# Patient Record
Sex: FEMALE | Race: WHITE | NOT HISPANIC OR LATINO | Employment: OTHER | URBAN - METROPOLITAN AREA
[De-identification: names, ages, dates, MRNs, and addresses within clinical notes are randomized per-mention and may not be internally consistent; named-entity substitution may affect disease eponyms.]

---

## 2023-10-10 ENCOUNTER — TRANSCRIBE ORDERS (OUTPATIENT)
Dept: ADMINISTRATIVE | Facility: HOSPITAL | Age: 73
End: 2023-10-10
Payer: COMMERCIAL

## 2023-10-10 DIAGNOSIS — R19.4 CHANGE IN BOWEL HABITS: ICD-10-CM

## 2023-10-10 DIAGNOSIS — R11.2 NAUSEA AND VOMITING, UNSPECIFIED VOMITING TYPE: Primary | ICD-10-CM

## 2023-10-10 DIAGNOSIS — R63.4 WEIGHT LOSS: ICD-10-CM

## 2023-10-31 ENCOUNTER — TRANSCRIBE ORDERS (OUTPATIENT)
Dept: LAB | Facility: HOSPITAL | Age: 73
End: 2023-10-31
Payer: COMMERCIAL

## 2023-10-31 ENCOUNTER — HOSPITAL ENCOUNTER (OUTPATIENT)
Dept: CT IMAGING | Facility: HOSPITAL | Age: 73
Discharge: HOME | End: 2023-10-31
Admitting: INTERNAL MEDICINE
Payer: COMMERCIAL

## 2023-10-31 ENCOUNTER — LAB (OUTPATIENT)
Dept: LAB | Facility: HOSPITAL | Age: 73
End: 2023-10-31
Payer: COMMERCIAL

## 2023-10-31 ENCOUNTER — APPOINTMENT (OUTPATIENT)
Dept: CT IMAGING | Facility: HOSPITAL | Age: 73
End: 2023-10-31
Payer: COMMERCIAL

## 2023-10-31 DIAGNOSIS — R63.4 WEIGHT LOSS: ICD-10-CM

## 2023-10-31 DIAGNOSIS — R19.4 CHANGE IN BOWEL HABITS: ICD-10-CM

## 2023-10-31 DIAGNOSIS — R19.4 FREQUENT BOWEL MOVEMENTS: ICD-10-CM

## 2023-10-31 DIAGNOSIS — R19.4 FREQUENT BOWEL MOVEMENTS: Primary | ICD-10-CM

## 2023-10-31 DIAGNOSIS — R11.2 NAUSEA AND VOMITING, UNSPECIFIED VOMITING TYPE: ICD-10-CM

## 2023-10-31 LAB
ALBUMIN SERPL-MCNC: 3.9 G/DL (ref 3.5–5.2)
ALBUMIN/GLOB SERPL: 1.6 G/DL
ALP SERPL-CCNC: 81 U/L (ref 39–117)
ALT SERPL W P-5'-P-CCNC: 18 U/L (ref 1–33)
ANION GAP SERPL CALCULATED.3IONS-SCNC: 10.6 MMOL/L (ref 5–15)
AST SERPL-CCNC: 26 U/L (ref 1–32)
BASOPHILS # BLD AUTO: 0.04 10*3/MM3 (ref 0–0.2)
BASOPHILS NFR BLD AUTO: 0.6 % (ref 0–1.5)
BILIRUB SERPL-MCNC: 0.6 MG/DL (ref 0–1.2)
BUN SERPL-MCNC: 15 MG/DL (ref 8–23)
BUN/CREAT SERPL: 12.8 (ref 7–25)
CALCIUM SPEC-SCNC: 9.5 MG/DL (ref 8.6–10.5)
CHLORIDE SERPL-SCNC: 105 MMOL/L (ref 98–107)
CO2 SERPL-SCNC: 22.4 MMOL/L (ref 22–29)
CREAT BLDA-MCNC: 1.2 MG/DL (ref 0.6–1.3)
CREAT SERPL-MCNC: 1.17 MG/DL (ref 0.57–1)
DEPRECATED RDW RBC AUTO: 41 FL (ref 37–54)
EGFRCR SERPLBLD CKD-EPI 2021: 49.7 ML/MIN/1.73
EOSINOPHIL # BLD AUTO: 0.06 10*3/MM3 (ref 0–0.4)
EOSINOPHIL NFR BLD AUTO: 0.9 % (ref 0.3–6.2)
ERYTHROCYTE [DISTWIDTH] IN BLOOD BY AUTOMATED COUNT: 11.8 % (ref 12.3–15.4)
GLOBULIN UR ELPH-MCNC: 2.5 GM/DL
GLUCOSE SERPL-MCNC: 175 MG/DL (ref 65–99)
HCT VFR BLD AUTO: 44.3 % (ref 34–46.6)
HGB BLD-MCNC: 14.7 G/DL (ref 12–15.9)
IMM GRANULOCYTES # BLD AUTO: 0.03 10*3/MM3 (ref 0–0.05)
IMM GRANULOCYTES NFR BLD AUTO: 0.4 % (ref 0–0.5)
LDH SERPL-CCNC: 303 U/L (ref 135–214)
LYMPHOCYTES # BLD AUTO: 1.92 10*3/MM3 (ref 0.7–3.1)
LYMPHOCYTES NFR BLD AUTO: 28.7 % (ref 19.6–45.3)
MCH RBC QN AUTO: 32 PG (ref 26.6–33)
MCHC RBC AUTO-ENTMCNC: 33.2 G/DL (ref 31.5–35.7)
MCV RBC AUTO: 96.3 FL (ref 79–97)
MONOCYTES # BLD AUTO: 0.23 10*3/MM3 (ref 0.1–0.9)
MONOCYTES NFR BLD AUTO: 3.4 % (ref 5–12)
NEUTROPHILS NFR BLD AUTO: 4.41 10*3/MM3 (ref 1.7–7)
NEUTROPHILS NFR BLD AUTO: 66 % (ref 42.7–76)
NRBC BLD AUTO-RTO: 0 /100 WBC (ref 0–0.2)
PLATELET # BLD AUTO: 200 10*3/MM3 (ref 140–450)
PMV BLD AUTO: 10.9 FL (ref 6–12)
POTASSIUM SERPL-SCNC: 3.9 MMOL/L (ref 3.5–5.2)
PROT SERPL-MCNC: 6.4 G/DL (ref 6–8.5)
RBC # BLD AUTO: 4.6 10*6/MM3 (ref 3.77–5.28)
SODIUM SERPL-SCNC: 138 MMOL/L (ref 136–145)
WBC NRBC COR # BLD: 6.69 10*3/MM3 (ref 3.4–10.8)

## 2023-10-31 PROCEDURE — 82565 ASSAY OF CREATININE: CPT

## 2023-10-31 PROCEDURE — 80053 COMPREHEN METABOLIC PANEL: CPT | Performed by: INTERNAL MEDICINE

## 2023-10-31 PROCEDURE — 83615 LACTATE (LD) (LDH) ENZYME: CPT

## 2023-10-31 PROCEDURE — 85025 COMPLETE CBC W/AUTO DIFF WBC: CPT

## 2023-10-31 PROCEDURE — 74177 CT ABD & PELVIS W/CONTRAST: CPT

## 2023-10-31 PROCEDURE — 25510000001 IOPAMIDOL 61 % SOLUTION: Performed by: INTERNAL MEDICINE

## 2023-10-31 PROCEDURE — 36415 COLL VENOUS BLD VENIPUNCTURE: CPT | Performed by: INTERNAL MEDICINE

## 2023-10-31 PROCEDURE — 71260 CT THORAX DX C+: CPT

## 2023-10-31 RX ADMIN — IOPAMIDOL 85 ML: 612 INJECTION, SOLUTION INTRAVENOUS at 13:46

## 2024-01-04 ENCOUNTER — LAB (OUTPATIENT)
Dept: LAB | Facility: HOSPITAL | Age: 74
End: 2024-01-04
Payer: COMMERCIAL

## 2024-01-04 ENCOUNTER — CONSULT (OUTPATIENT)
Dept: ONCOLOGY | Facility: CLINIC | Age: 74
End: 2024-01-04
Payer: COMMERCIAL

## 2024-01-04 VITALS
DIASTOLIC BLOOD PRESSURE: 75 MMHG | WEIGHT: 162 LBS | HEART RATE: 85 BPM | HEIGHT: 62 IN | RESPIRATION RATE: 16 BRPM | BODY MASS INDEX: 29.81 KG/M2 | TEMPERATURE: 97.6 F | SYSTOLIC BLOOD PRESSURE: 142 MMHG | OXYGEN SATURATION: 97 %

## 2024-01-04 DIAGNOSIS — C82.09 GRADE I FOLLICULAR LYMPHOMA OF EXTRANODAL SITE EXCLUDING SPLEEN AND OTHER SOLID ORGANS: ICD-10-CM

## 2024-01-04 DIAGNOSIS — C82.09 GRADE I FOLLICULAR LYMPHOMA OF EXTRANODAL SITE EXCLUDING SPLEEN AND OTHER SOLID ORGANS: Primary | ICD-10-CM

## 2024-01-04 LAB
ALBUMIN SERPL-MCNC: 4.3 G/DL (ref 3.5–5.2)
ALBUMIN/GLOB SERPL: 1.7 G/DL
ALP SERPL-CCNC: 129 U/L (ref 39–117)
ALT SERPL W P-5'-P-CCNC: 16 U/L (ref 1–33)
ANION GAP SERPL CALCULATED.3IONS-SCNC: 9 MMOL/L (ref 5–15)
AST SERPL-CCNC: 24 U/L (ref 1–32)
BASOPHILS # BLD AUTO: 0.02 10*3/MM3 (ref 0–0.2)
BASOPHILS NFR BLD AUTO: 0.4 % (ref 0–1.5)
BILIRUB SERPL-MCNC: 1.1 MG/DL (ref 0–1.2)
BUN SERPL-MCNC: 11 MG/DL (ref 8–23)
BUN/CREAT SERPL: 10.8 (ref 7–25)
CALCIUM SPEC-SCNC: 9.5 MG/DL (ref 8.6–10.5)
CHLORIDE SERPL-SCNC: 110 MMOL/L (ref 98–107)
CO2 SERPL-SCNC: 26 MMOL/L (ref 22–29)
CREAT SERPL-MCNC: 1.02 MG/DL (ref 0.57–1)
DEPRECATED RDW RBC AUTO: 41.7 FL (ref 37–54)
EGFRCR SERPLBLD CKD-EPI 2021: 58.2 ML/MIN/1.73
EOSINOPHIL # BLD AUTO: 0.09 10*3/MM3 (ref 0–0.4)
EOSINOPHIL NFR BLD AUTO: 1.6 % (ref 0.3–6.2)
ERYTHROCYTE [DISTWIDTH] IN BLOOD BY AUTOMATED COUNT: 11.7 % (ref 12.3–15.4)
ERYTHROCYTE [SEDIMENTATION RATE] IN BLOOD: 19 MM/HR (ref 0–30)
GLOBULIN UR ELPH-MCNC: 2.5 GM/DL
GLUCOSE SERPL-MCNC: 108 MG/DL (ref 65–99)
HCT VFR BLD AUTO: 46.3 % (ref 34–46.6)
HCV AB SER DONR QL: NORMAL
HGB BLD-MCNC: 15.4 G/DL (ref 12–15.9)
HIV 1+2 AB+HIV1 P24 AG SERPL QL IA: NORMAL
IMM GRANULOCYTES # BLD AUTO: 0.02 10*3/MM3 (ref 0–0.05)
IMM GRANULOCYTES NFR BLD AUTO: 0.4 % (ref 0–0.5)
LDH SERPL-CCNC: 227 U/L (ref 135–214)
LYMPHOCYTES # BLD AUTO: 1.62 10*3/MM3 (ref 0.7–3.1)
LYMPHOCYTES NFR BLD AUTO: 29 % (ref 19.6–45.3)
MCH RBC QN AUTO: 31.9 PG (ref 26.6–33)
MCHC RBC AUTO-ENTMCNC: 33.3 G/DL (ref 31.5–35.7)
MCV RBC AUTO: 95.9 FL (ref 79–97)
MONOCYTES # BLD AUTO: 0.43 10*3/MM3 (ref 0.1–0.9)
MONOCYTES NFR BLD AUTO: 7.7 % (ref 5–12)
NEUTROPHILS NFR BLD AUTO: 3.4 10*3/MM3 (ref 1.7–7)
NEUTROPHILS NFR BLD AUTO: 60.9 % (ref 42.7–76)
PLATELET # BLD AUTO: 204 10*3/MM3 (ref 140–450)
PMV BLD AUTO: 9.6 FL (ref 6–12)
POTASSIUM SERPL-SCNC: 3.9 MMOL/L (ref 3.5–5.2)
PROT SERPL-MCNC: 6.8 G/DL (ref 6–8.5)
RBC # BLD AUTO: 4.83 10*6/MM3 (ref 3.77–5.28)
SODIUM SERPL-SCNC: 145 MMOL/L (ref 136–145)
URATE SERPL-MCNC: 5.9 MG/DL (ref 2.4–5.7)
WBC NRBC COR # BLD AUTO: 5.58 10*3/MM3 (ref 3.4–10.8)

## 2024-01-04 PROCEDURE — 85652 RBC SED RATE AUTOMATED: CPT

## 2024-01-04 PROCEDURE — 82784 ASSAY IGA/IGD/IGG/IGM EACH: CPT

## 2024-01-04 PROCEDURE — 80053 COMPREHEN METABOLIC PANEL: CPT

## 2024-01-04 PROCEDURE — 82785 ASSAY OF IGE: CPT

## 2024-01-04 PROCEDURE — 36415 COLL VENOUS BLD VENIPUNCTURE: CPT

## 2024-01-04 PROCEDURE — 84165 PROTEIN E-PHORESIS SERUM: CPT

## 2024-01-04 PROCEDURE — G0432 EIA HIV-1/HIV-2 SCREEN: HCPCS

## 2024-01-04 PROCEDURE — 86704 HEP B CORE ANTIBODY TOTAL: CPT

## 2024-01-04 PROCEDURE — 83013 H PYLORI (C-13) BREATH: CPT

## 2024-01-04 PROCEDURE — 83615 LACTATE (LD) (LDH) ENZYME: CPT

## 2024-01-04 PROCEDURE — 86706 HEP B SURFACE ANTIBODY: CPT

## 2024-01-04 PROCEDURE — 86334 IMMUNOFIX E-PHORESIS SERUM: CPT

## 2024-01-04 PROCEDURE — 87340 HEPATITIS B SURFACE AG IA: CPT

## 2024-01-04 PROCEDURE — 84550 ASSAY OF BLOOD/URIC ACID: CPT

## 2024-01-04 PROCEDURE — 86803 HEPATITIS C AB TEST: CPT

## 2024-01-04 PROCEDURE — 85025 COMPLETE CBC W/AUTO DIFF WBC: CPT

## 2024-01-04 RX ORDER — ONDANSETRON 4 MG/1
4 TABLET, FILM COATED ORAL EVERY 8 HOURS PRN
COMMUNITY
Start: 2023-07-01

## 2024-01-04 RX ORDER — ESOMEPRAZOLE MAGNESIUM 40 MG/1
40 CAPSULE, DELAYED RELEASE ORAL 2 TIMES DAILY
COMMUNITY
Start: 2023-09-08

## 2024-01-04 RX ORDER — SIMVASTATIN 40 MG
40 TABLET ORAL NIGHTLY
COMMUNITY

## 2024-01-04 RX ORDER — VORTIOXETINE 10 MG/1
10 TABLET, FILM COATED ORAL DAILY
COMMUNITY
Start: 2023-08-01

## 2024-01-04 RX ORDER — FAMOTIDINE 20 MG/1
20 TABLET, FILM COATED ORAL 2 TIMES DAILY
COMMUNITY

## 2024-01-04 RX ORDER — ERGOCALCIFEROL 1.25 MG/1
50000 CAPSULE ORAL
COMMUNITY
Start: 2023-11-09

## 2024-01-04 RX ORDER — AMLODIPINE BESYLATE 10 MG/1
10 TABLET ORAL DAILY
COMMUNITY

## 2024-01-04 NOTE — PROGRESS NOTES
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 Ortiz were reviewed and summarized in detail.    Oncology/Hematology History Overview Note   1.  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  Stage IIIa chronic kidney disease      Hematology oncology history timeline:  -9/23/2023 stool cultures negative for pathogens  -10/6/2023 EGD and colonoscopy Dr. Ortiz.  Antral biopsies showed minimal chronic gastritis H. pylori negative.  Transverse colon has a serrated sessile polyp negative for dysplasia.  Ileum biopsy showed atypical lymphoid infiltrate with some monotony.  Extra departmental review by [facility] [place] [facility] consultant agreed with atypical lymphoid infiltrate that would support a low-grade follicular lymphoma but clinical correlation is essential per consultant.  Cells are positive for CD20 and less positive for CD3 and CD5.  Bcl-2 and CD10 strongly positive negative for cyclin D1.  -10/31/2023 CT chest abdomen pelvis with contrast with no worrisome findings for occult neoplasm.  Normal CBC and differential.  CMP unremarkable save for creatinine 1.17 GFR 49.7.  LDH mildly elevated 303 upper limit of normal 214.  -12/5/2023 colonoscopy 4 small ulcerationsin distal tumor ileum showed sigmoid polyp tubular adenoma. No colitis. Terminal ileum biopsy showed atypical lambda restricted clonal lymphoid proliferation consistent with low-grade lymphoproliferative disorder such as low-grade follicular lymphoma.    -1/4/2024 [facility] medical oncology hematology consult: Patient has felt vaguely poorly for the better part of the year.  Around September started having nausea and occasional vomiting and occasional diarrhea but mostly just progressive fatigue despite no anemia.  Endoscopy in October and again in December showed ulcerations in the distal  ileum without colitis but with lambda restricted clonal B-cell low-grade follicular lymphoma.  This is unlikely to be the immunoproliferative alpha heavy chain related MALT lymphoma but to be more likely the nonimmunoproliferative low-grade follicular lymphoma that may be associated with inflammatory bowel though the ileal biopsies do not confirm colitis nor did the CAT scan show any colitis.  PET scans are not generally extremely helpful with low-grade lymphomas but given that we do not have any other easily measurable disease I think it is reasonable to check a PET for that as well as to be sure there is no unexpectedly hot areas that may have discordant histology that may need more aggressive therapy if higher grade.  If the pet does light up vaguely, that would not distinguish inflammatory bowel from low-grade follicular B-cell lymphoma of the small bowel as they would both light up similarly.  I have communicated with Dr. Ortiz to get the ileum biopsy from December stain for H. pylori and Campylobacter jejuni and I will check her CBC, CMP, HIV, hep B, hep C, H. pylori breath test, and alpha heavy chains along with serum immunofixation electrophoresis.  At the end of the day, treatment will largely be guided by her symptoms which while nebulous are significant enough that I suspect we will end up giving her Rituxan in the next month or 2 pending results of these tests.  She understands the common dilemma with low-grade lymphomas of deciding when to treat as there is a very fine risk-benefit ratio balance.She has had frequent upper respiratory infections and I will check her quantitative immunoglobulins as well.     Grade I follicular lymphoma of extranodal site excluding spleen and other solid organs   1/4/2024 Initial Diagnosis    Grade I follicular lymphoma of extranodal site excluding spleen and other solid organs     1/4/2024 Cancer Staged    Staging form: Hodgkin And Non-Hodgkin Lymphoma, AJCC 8th  Edition  - Clinical stage from 1/4/2024: Stage IV (Follicular lymphoma) - Signed by En Carballo MD on 1/4/2024         HISTORY OF PRESENT ILLNESS:  The patient is a 73 y.o.  female, referred for small bowel lymphoma diagnosed on biopsy in October and again in December with a years worth of fatigue and started with nausea and vomiting back in September better presently without significant diarrhea but just occasional loose stools and no CT evidence of lymphoma nor colitis and biopsies showing lambda restricted B-cell lymphoma low-grade with no evidence of colitis on biopsy    REVIEW OF SYSTEMS:  General fatigue.  No bed drenching night sweats.  No unexplained weight loss.  Has had frequent upper respiratory infections treated with antibiotics twice within the last 6 weeks    History reviewed. No pertinent past medical history.  History reviewed. No pertinent surgical history.    Current Outpatient Medications on File Prior to Visit   Medication Sig Dispense Refill    amLODIPine (NORVASC) 10 MG tablet Take 1 tablet by mouth Daily.      esomeprazole (nexIUM) 40 MG capsule Take 1 capsule by mouth 2 (Two) Times a Day.      famotidine (PEPCID) 20 MG tablet Take 1 tablet by mouth 2 (Two) Times a Day.      ondansetron (ZOFRAN) 4 MG tablet Take 1 tablet by mouth Every 8 (Eight) Hours As Needed for Nausea or Vomiting.      simvastatin (ZOCOR) 40 MG tablet Take 1 tablet by mouth Every Night.      Trintellix 10 MG tablet tablet Take 1 tablet by mouth Daily.      vitamin D (ERGOCALCIFEROL) 1.25 MG (74046 UT) capsule capsule Take 1 capsule by mouth Every 7 (Seven) Days.       No current facility-administered medications on file prior to visit.       No Known Allergies    Social History     Socioeconomic History    Marital status:        History reviewed. No pertinent family history.    PHYSICAL EXAM:  No palpable cervical axillary or inguinal adenopathy.  No hepatosplenomegaly.    /75   Pulse 85   Temp 97.6 °F  "(36.4 °C)   Resp 16   Ht 157.5 cm (62\")   Wt 73.5 kg (162 lb)   SpO2 97%   BMI 29.63 kg/m²     ECOG score: 1           ECOG: (1) Restricted in Physically Strenuous Activity, Ambulatory & Able to Do Work of Light Nature    Lab Results   Component Value Date    HGB 14.7 10/31/2023    HCT 44.3 10/31/2023    MCV 96.3 10/31/2023     10/31/2023    WBC 6.69 10/31/2023    NEUTROABS 4.41 10/31/2023    LYMPHSABS 1.92 10/31/2023    MONOSABS 0.23 10/31/2023    EOSABS 0.06 10/31/2023    BASOSABS 0.04 10/31/2023     Lab Results   Component Value Date    GLUCOSE 175 (H) 10/31/2023    BUN 15 10/31/2023    CREATININE 1.20 10/31/2023     10/31/2023    K 3.9 10/31/2023     10/31/2023    CO2 22.4 10/31/2023    CALCIUM 9.5 10/31/2023    PROTEINTOT 6.4 10/31/2023    ALBUMIN 3.9 10/31/2023    BILITOT 0.6 10/31/2023    ALKPHOS 81 10/31/2023    AST 26 10/31/2023    ALT 18 10/31/2023         Assessment & Plan   1.  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  Stage IIIa chronic kidney disease      Hematology oncology history timeline:  -9/23/2023 stool cultures negative for pathogens  -10/6/2023 EGD and colonoscopy Dr. Ortiz.  Antral biopsies showed minimal chronic gastritis H. pylori negative.  Transverse colon has a serrated sessile polyp negative for dysplasia.  Ileum biopsy showed atypical lymphoid infiltrate with some monotony.  Extra departmental review by [facility] [place] [facility] consultant agreed with atypical lymphoid infiltrate that would support a low-grade follicular lymphoma but clinical correlation is essential per consultant.  Cells are positive for CD20 and less positive for CD3 and CD5.  Bcl-2 and CD10 strongly positive negative for cyclin D1.  -10/31/2023 CT chest abdomen pelvis with contrast with no worrisome findings for occult neoplasm.  Normal CBC and differential.  CMP unremarkable save for creatinine 1.17 GFR 49.7.  LDH mildly elevated " 303 upper limit of normal 214.  -12/5/2023 colonoscopy 4 small ulcerationsin distal tumor ileum showed sigmoid polyp tubular adenoma. No colitis. Terminal ileum biopsy showed atypical lambda restricted clonal lymphoid proliferation consistent with low-grade lymphoproliferative disorder such as low-grade follicular lymphoma.    -1/4/2024 [facility] medical oncology hematology consult: Patient has felt vaguely poorly for the better part of the year.  Around September started having nausea and occasional vomiting and occasional diarrhea but mostly just progressive fatigue despite no anemia.  Endoscopy in October and again in December showed ulcerations in the distal ileum without colitis but with lambda restricted clonal B-cell low-grade follicular lymphoma.  This is unlikely to be the immunoproliferative alpha heavy chain related MALT lymphoma but to be more likely the nonimmunoproliferative low-grade follicular lymphoma that may be associated with inflammatory bowel though the ileal biopsies do not confirm colitis nor did the CAT scan show any colitis.  PET scans are not generally extremely helpful with low-grade lymphomas but given that we do not have any other easily measurable disease I think it is reasonable to check a PET for that as well as to be sure there is no unexpectedly hot areas that may have discordant histology that may need more aggressive therapy if higher grade.  If the pet does light up vaguely, that would not distinguish inflammatory bowel from low-grade follicular B-cell lymphoma of the small bowel as they would both light up similarly.  I have communicated with Dr. Ortiz to get the ileum biopsy from December stain for H. pylori and Campylobacter jejuni and I will check her CBC, CMP, HIV, hep B, hep C, H. pylori breath test, and alpha heavy chains along with serum immunofixation electrophoresis.  At the end of the day, treatment will largely be guided by her symptoms which while nebulous are  significant enough that I suspect we will end up giving her Rituxan in the next month or 2 pending results of these tests.  She understands the common dilemma with low-grade lymphomas of deciding when to treat as there is a very fine risk-benefit ratio balance.  She has had frequent upper respiratory infections and I will check her quantitative immunoglobulins as well.    Total time of care today inclusive of time spent today prior to patient's arrival reviewing past records from Dr. Ortiz and discussing with him by phone the complex decision analysis as outlined above during visit discussing the signs and symptoms of the causes and consequences of watchful waiting versus treatment of low-grade follicular lymphoma of the small bowel and after visit instituting this plan took 1 hour patient care time throughout the day today.      En Carballo MD    1/4/2024

## 2024-01-04 NOTE — LETTER
January 4, 2024       No Recipients    Patient: Cata Hidalgo   YOB: 1950   Date of Visit: 1/4/2024     Dear Santiago Collado, DO:       Thank you for referring Cata Hidalgo to me for evaluation. Below are the relevant portions of my assessment and plan of care.    If you have questions, please do not hesitate to call me. I look forward to following Cata along with you.         Sincerely,        En Carballo MD        CC:   No Recipients    En Carballo MD  01/04/24 0856  Sign when Signing Visit  CHIEF COMPLAINT: Fatigue    REASON FOR REFERRAL: Small bowel lymphoma      RECORDS OBTAINED  Records of the patients history including those obtained from Dr. Ortiz were reviewed and summarized in detail.    Oncology/Hematology History Overview Note   1.  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  Stage IIIa chronic kidney disease      Hematology oncology history timeline:  -9/23/2023 stool cultures negative for pathogens  -10/6/2023 EGD and colonoscopy Dr. Ortiz.  Antral biopsies showed minimal chronic gastritis H. pylori negative.  Transverse colon has a serrated sessile polyp negative for dysplasia.  Ileum biopsy showed atypical lymphoid infiltrate with some monotony.  Extra departmental review by [facility] [place] [facility] consultant agreed with atypical lymphoid infiltrate that would support a low-grade follicular lymphoma but clinical correlation is essential per consultant.  Cells are positive for CD20 and less positive for CD3 and CD5.  Bcl-2 and CD10 strongly positive negative for cyclin D1.  -10/31/2023 CT chest abdomen pelvis with contrast with no worrisome findings for occult neoplasm.  Normal CBC and differential.  CMP unremarkable save for creatinine 1.17 GFR 49.7.  LDH mildly elevated 303 upper limit of normal 214.  -12/5/2023 colonoscopy 4 small ulcerationsin distal tumor ileum showed sigmoid polyp tubular adenoma. No  colitis. Terminal ileum biopsy showed atypical lambda restricted clonal lymphoid proliferation consistent with low-grade lymphoproliferative disorder such as low-grade follicular lymphoma.    -1/4/2024 [facility] oncology hematology consult: Patient has felt vaguely poorly for the better part of the year.  Around September started having nausea and occasional vomiting and occasional diarrhea but mostly just progressive fatigue despite no anemia.  Endoscopy in October and again in December showed ulcerations in the distal ileum without colitis but with lambda restricted clonal B-cell low-grade follicular lymphoma.  This is unlikely to be the immunoproliferative alpha heavy chain related MALT lymphoma but to be more likely the nonimmunoproliferative low-grade follicular lymphoma that may be associated with inflammatory bowel though the ileal biopsies do not confirm colitis nor did the CAT scan show any colitis.  PET scans are not generally extremely helpful with low-grade lymphomas but given that we do not have any other easily measurable disease I think it is reasonable to check a PET for that as well as to be sure there is no unexpectedly hot areas that may have discordant histology that may need more aggressive therapy if higher grade.  If the pet does light up vaguely, that would not distinguish inflammatory bowel from low-grade follicular B-cell lymphoma of the small bowel as they would both light up similarly.  I have communicated with Dr. Ortiz to get the ileum biopsy from December stain for H. pylori and Campylobacter jejuni and I will check her CBC, CMP, HIV, hep B, hep C, H. pylori breath test, and alpha heavy chains along with serum immunofixation electrophoresis.  At the end of the day, treatment will largely be guided by her symptoms which while nebulous are significant enough that I suspect we will end up giving her Rituxan in the next month or 2 pending results of these tests.  She understands the  common dilemma with low-grade lymphomas of deciding when to treat as there is a very fine risk-benefit ratio balance.She has had frequent upper respiratory infections and I will check her quantitative immunoglobulins as well.     Grade I follicular lymphoma of extranodal site excluding spleen and other solid organs   1/4/2024 Initial Diagnosis    Grade I follicular lymphoma of extranodal site excluding spleen and other solid organs     1/4/2024 Cancer Staged    Staging form: Hodgkin And Non-Hodgkin Lymphoma, AJCC 8th Edition  - Clinical stage from 1/4/2024: Stage IV (Follicular lymphoma) - Signed by En Carballo MD on 1/4/2024         HISTORY OF PRESENT ILLNESS:  The patient is a 73 y.o.  female, referred for small bowel lymphoma diagnosed on biopsy in October and again in December with a years worth of fatigue and started with nausea and vomiting back in September better presently without significant diarrhea but just occasional loose stools and no CT evidence of lymphoma nor colitis and biopsies showing lambda restricted B-cell lymphoma low-grade with no evidence of colitis on biopsy    REVIEW OF SYSTEMS:  General fatigue.  No bed drenching night sweats.  No unexplained weight loss.  Has had frequent upper respiratory infections treated with antibiotics twice within the last 6 weeks    History reviewed. No pertinent past medical history.  History reviewed. No pertinent surgical history.    Current Outpatient Medications on File Prior to Visit   Medication Sig Dispense Refill   • amLODIPine (NORVASC) 10 MG tablet Take 1 tablet by mouth Daily.     • esomeprazole (nexIUM) 40 MG capsule Take 1 capsule by mouth 2 (Two) Times a Day.     • famotidine (PEPCID) 20 MG tablet Take 1 tablet by mouth 2 (Two) Times a Day.     • ondansetron (ZOFRAN) 4 MG tablet Take 1 tablet by mouth Every 8 (Eight) Hours As Needed for Nausea or Vomiting.     • simvastatin (ZOCOR) 40 MG tablet Take 1 tablet by mouth Every Night.     •  "Trintellix 10 MG tablet tablet Take 1 tablet by mouth Daily.     • vitamin D (ERGOCALCIFEROL) 1.25 MG (01512 UT) capsule capsule Take 1 capsule by mouth Every 7 (Seven) Days.       No current facility-administered medications on file prior to visit.       No Known Allergies    Social History     Socioeconomic History   • Marital status:        History reviewed. No pertinent family history.    PHYSICAL EXAM:  No palpable cervical axillary or inguinal adenopathy.  No hepatosplenomegaly.    /75   Pulse 85   Temp 97.6 °F (36.4 °C)   Resp 16   Ht 157.5 cm (62\")   Wt 73.5 kg (162 lb)   SpO2 97%   BMI 29.63 kg/m²     ECOG score: 1           ECOG: (1) Restricted in Physically Strenuous Activity, Ambulatory & Able to Do Work of Light Nature    Lab Results   Component Value Date    HGB 14.7 10/31/2023    HCT 44.3 10/31/2023    MCV 96.3 10/31/2023     10/31/2023    WBC 6.69 10/31/2023    NEUTROABS 4.41 10/31/2023    LYMPHSABS 1.92 10/31/2023    MONOSABS 0.23 10/31/2023    EOSABS 0.06 10/31/2023    BASOSABS 0.04 10/31/2023     Lab Results   Component Value Date    GLUCOSE 175 (H) 10/31/2023    BUN 15 10/31/2023    CREATININE 1.20 10/31/2023     10/31/2023    K 3.9 10/31/2023     10/31/2023    CO2 22.4 10/31/2023    CALCIUM 9.5 10/31/2023    PROTEINTOT 6.4 10/31/2023    ALBUMIN 3.9 10/31/2023    BILITOT 0.6 10/31/2023    ALKPHOS 81 10/31/2023    AST 26 10/31/2023    ALT 18 10/31/2023         Assessment & Plan  1.  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  Stage IIIa chronic kidney disease      Hematology oncology history timeline:  -9/23/2023 stool cultures negative for pathogens  -10/6/2023 EGD and colonoscopy Dr. Ortiz.  Antral biopsies showed minimal chronic gastritis H. pylori negative.  Transverse colon has a serrated sessile polyp negative for dysplasia.  Ileum biopsy showed atypical lymphoid infiltrate with some monotony.  " Extra departmental review by [facility] [place] [facility] consultant agreed with atypical lymphoid infiltrate that would support a low-grade follicular lymphoma but clinical correlation is essential per consultant.  Cells are positive for CD20 and less positive for CD3 and CD5.  Bcl-2 and CD10 strongly positive negative for cyclin D1.  -10/31/2023 CT chest abdomen pelvis with contrast with no worrisome findings for occult neoplasm.  Normal CBC and differential.  CMP unremarkable save for creatinine 1.17 GFR 49.7.  LDH mildly elevated 303 upper limit of normal 214.  -12/5/2023 colonoscopy 4 small ulcerationsin distal tumor ileum showed sigmoid polyp tubular adenoma. No colitis. Terminal ileum biopsy showed atypical lambda restricted clonal lymphoid proliferation consistent with low-grade lymphoproliferative disorder such as low-grade follicular lymphoma.    -1/4/2024 [facility] medical oncology hematology consult: Patient has felt vaguely poorly for the better part of the year.  Around September started having nausea and occasional vomiting and occasional diarrhea but mostly just progressive fatigue despite no anemia.  Endoscopy in October and again in December showed ulcerations in the distal ileum without colitis but with lambda restricted clonal B-cell low-grade follicular lymphoma.  This is unlikely to be the immunoproliferative alpha heavy chain related MALT lymphoma but to be more likely the nonimmunoproliferative low-grade follicular lymphoma that may be associated with inflammatory bowel though the ileal biopsies do not confirm colitis nor did the CAT scan show any colitis.  PET scans are not generally extremely helpful with low-grade lymphomas but given that we do not have any other easily measurable disease I think it is reasonable to check a PET for that as well as to be sure there is no unexpectedly hot areas that may have discordant histology that may need more aggressive therapy if higher grade.  If the pet  does light up vaguely, that would not distinguish inflammatory bowel from low-grade follicular B-cell lymphoma of the small bowel as they would both light up similarly.  I have communicated with Dr. Ortiz to get the ileum biopsy from December stain for H. pylori and Campylobacter jejuni and I will check her CBC, CMP, HIV, hep B, hep C, H. pylori breath test, and alpha heavy chains along with serum immunofixation electrophoresis.  At the end of the day, treatment will largely be guided by her symptoms which while nebulous are significant enough that I suspect we will end up giving her Rituxan in the next month or 2 pending results of these tests.  She understands the common dilemma with low-grade lymphomas of deciding when to treat as there is a very fine risk-benefit ratio balance.  She has had frequent upper respiratory infections and I will check her quantitative immunoglobulins as well.    Total time of care today inclusive of time spent today prior to patient's arrival reviewing past records from Dr. Ortiz and discussing with him by phone the complex decision analysis as outlined above during visit discussing the signs and symptoms of the causes and consequences of watchful waiting versus treatment of low-grade follicular lymphoma of the small bowel and after visit instituting this plan took 1 hour patient care time throughout the day today.      En Carballo MD    1/4/2024

## 2024-01-05 LAB
ALBUMIN SERPL ELPH-MCNC: 3.6 G/DL (ref 2.9–4.4)
ALBUMIN/GLOB SERPL: 1.3 {RATIO} (ref 0.7–1.7)
ALPHA1 GLOB SERPL ELPH-MCNC: 0.2 G/DL (ref 0–0.4)
ALPHA2 GLOB SERPL ELPH-MCNC: 0.7 G/DL (ref 0.4–1)
B-GLOBULIN SERPL ELPH-MCNC: 1.1 G/DL (ref 0.7–1.3)
GAMMA GLOB SERPL ELPH-MCNC: 0.9 G/DL (ref 0.4–1.8)
GLOBULIN SER-MCNC: 2.8 G/DL (ref 2.2–3.9)
HBV CORE AB SERPL QL IA: NEGATIVE
HBV SURFACE AB SER QL: REACTIVE
HBV SURFACE AG SERPL QL IA: NEGATIVE
IGA SERPL-MCNC: 297 MG/DL (ref 64–422)
IGG SERPL-MCNC: 887 MG/DL (ref 586–1602)
IGM SERPL-MCNC: 63 MG/DL (ref 26–217)
IMP & REVIEW OF LAB RESULTS: NORMAL
INTERPRETATION SERPL IEP-IMP: NORMAL
LABORATORY COMMENT REPORT: NORMAL
LABORATORY COMMENT REPORT: NORMAL
M PROTEIN SERPL ELPH-MCNC: NORMAL G/DL
PROT SERPL-MCNC: 6.4 G/DL (ref 6–8.5)

## 2024-01-06 LAB — UREA BREATH TEST QL: NEGATIVE

## 2024-01-07 LAB — IGE SERPL-ACNC: 9 IU/ML (ref 6–495)

## 2024-01-11 ENCOUNTER — HOSPITAL ENCOUNTER (OUTPATIENT)
Dept: PET IMAGING | Facility: HOSPITAL | Age: 74
Discharge: HOME | End: 2024-01-11
Payer: COMMERCIAL

## 2024-01-11 DIAGNOSIS — C82.09 GRADE I FOLLICULAR LYMPHOMA OF EXTRANODAL SITE EXCLUDING SPLEEN AND OTHER SOLID ORGANS: ICD-10-CM

## 2024-01-11 LAB — GLUCOSE BLDC GLUCOMTR-MCNC: 93 MG/DL (ref 70–130)

## 2024-01-11 PROCEDURE — 0 FLUDEOXYGLUCOSE F18 SOLUTION: Performed by: INTERNAL MEDICINE

## 2024-01-11 PROCEDURE — 78815 PET IMAGE W/CT SKULL-THIGH: CPT

## 2024-01-11 PROCEDURE — 82948 REAGENT STRIP/BLOOD GLUCOSE: CPT

## 2024-01-11 PROCEDURE — A9552 F18 FDG: HCPCS | Performed by: INTERNAL MEDICINE

## 2024-01-11 RX ADMIN — FLUDEOXYGLUCOSE F 18 1 DOSE: 200 INJECTION, SOLUTION INTRAVENOUS at 09:52

## 2024-01-12 LAB
REF LAB TEST RESULTS: NORMAL

## 2024-01-16 ENCOUNTER — TELEPHONE (OUTPATIENT)
Dept: ONCOLOGY | Facility: CLINIC | Age: 74
End: 2024-01-16
Payer: COMMERCIAL

## 2024-01-16 NOTE — TELEPHONE ENCOUNTER
Discussed with Dr. Carballo and he would like patient to keep appt Thursday to discuss. Called patient and she verbalized understanding. She knows that she needs to get her mychart set up. In basket message sent to Sharifa to get patients appt rescheduled for Thursday at 9:30.

## 2024-01-16 NOTE — TELEPHONE ENCOUNTER
Caller: Cata Hidalgo    Relationship: Self    Best call back number: [phone]    What test was performed: PET SCAN    When was the test performed: 01/11    Where was the test performed:     Additional notes: SHE HAD TO RESCHEDULE HER FOLLOW UP DUE TO THE WEATHER.

## 2024-01-18 ENCOUNTER — TELEMEDICINE (OUTPATIENT)
Dept: ONCOLOGY | Facility: CLINIC | Age: 74
End: 2024-01-18
Payer: COMMERCIAL

## 2024-01-18 ENCOUNTER — TELEPHONE (OUTPATIENT)
Dept: ONCOLOGY | Facility: CLINIC | Age: 74
End: 2024-01-18

## 2024-01-18 VITALS — BODY MASS INDEX: 29.63 KG/M2 | HEIGHT: 62 IN

## 2024-01-18 DIAGNOSIS — C82.09 GRADE I FOLLICULAR LYMPHOMA OF EXTRANODAL SITE EXCLUDING SPLEEN AND OTHER SOLID ORGANS: Primary | ICD-10-CM

## 2024-01-18 RX ORDER — DIPHENHYDRAMINE HYDROCHLORIDE 50 MG/ML
50 INJECTION INTRAMUSCULAR; INTRAVENOUS AS NEEDED
OUTPATIENT
Start: 2024-02-19

## 2024-01-18 RX ORDER — MEPERIDINE HYDROCHLORIDE 25 MG/ML
25 INJECTION INTRAMUSCULAR; INTRAVENOUS; SUBCUTANEOUS
OUTPATIENT
Start: 2024-02-12

## 2024-01-18 RX ORDER — FAMOTIDINE 10 MG/ML
20 INJECTION, SOLUTION INTRAVENOUS AS NEEDED
OUTPATIENT
Start: 2024-01-29

## 2024-01-18 RX ORDER — MEPERIDINE HYDROCHLORIDE 25 MG/ML
25 INJECTION INTRAMUSCULAR; INTRAVENOUS; SUBCUTANEOUS
OUTPATIENT
Start: 2024-02-05

## 2024-01-18 RX ORDER — LEVOCETIRIZINE DIHYDROCHLORIDE 5 MG/1
5 TABLET, FILM COATED ORAL EVERY EVENING
COMMUNITY
Start: 2024-01-09

## 2024-01-18 RX ORDER — SODIUM CHLORIDE 9 MG/ML
20 INJECTION, SOLUTION INTRAVENOUS ONCE
OUTPATIENT
Start: 2024-02-12

## 2024-01-18 RX ORDER — DIPHENHYDRAMINE HYDROCHLORIDE 50 MG/ML
50 INJECTION INTRAMUSCULAR; INTRAVENOUS AS NEEDED
OUTPATIENT
Start: 2024-02-12

## 2024-01-18 RX ORDER — ACETAMINOPHEN 325 MG/1
650 TABLET ORAL ONCE
OUTPATIENT
Start: 2024-02-12

## 2024-01-18 RX ORDER — DIPHENHYDRAMINE HYDROCHLORIDE 50 MG/ML
50 INJECTION INTRAMUSCULAR; INTRAVENOUS AS NEEDED
OUTPATIENT
Start: 2024-01-29

## 2024-01-18 RX ORDER — SODIUM CHLORIDE 9 MG/ML
20 INJECTION, SOLUTION INTRAVENOUS ONCE
OUTPATIENT
Start: 2024-01-29

## 2024-01-18 RX ORDER — MEPERIDINE HYDROCHLORIDE 25 MG/ML
25 INJECTION INTRAMUSCULAR; INTRAVENOUS; SUBCUTANEOUS
OUTPATIENT
Start: 2024-02-19

## 2024-01-18 RX ORDER — FAMOTIDINE 10 MG/ML
20 INJECTION, SOLUTION INTRAVENOUS AS NEEDED
OUTPATIENT
Start: 2024-02-12

## 2024-01-18 RX ORDER — SODIUM CHLORIDE 9 MG/ML
20 INJECTION, SOLUTION INTRAVENOUS ONCE
OUTPATIENT
Start: 2024-02-05

## 2024-01-18 RX ORDER — ACETAMINOPHEN 325 MG/1
650 TABLET ORAL ONCE
OUTPATIENT
Start: 2024-02-05

## 2024-01-18 RX ORDER — MEPERIDINE HYDROCHLORIDE 25 MG/ML
25 INJECTION INTRAMUSCULAR; INTRAVENOUS; SUBCUTANEOUS
OUTPATIENT
Start: 2024-01-29

## 2024-01-18 RX ORDER — FAMOTIDINE 10 MG/ML
20 INJECTION, SOLUTION INTRAVENOUS AS NEEDED
OUTPATIENT
Start: 2024-02-19

## 2024-01-18 RX ORDER — ACETAMINOPHEN 325 MG/1
650 TABLET ORAL ONCE
OUTPATIENT
Start: 2024-01-29

## 2024-01-18 RX ORDER — ACETAMINOPHEN 325 MG/1
650 TABLET ORAL ONCE
OUTPATIENT
Start: 2024-02-19

## 2024-01-18 RX ORDER — ONDANSETRON HYDROCHLORIDE 8 MG/1
8 TABLET, FILM COATED ORAL 3 TIMES DAILY PRN
Qty: 30 TABLET | Refills: 3 | Status: SHIPPED | OUTPATIENT
Start: 2024-01-18

## 2024-01-18 RX ORDER — FAMOTIDINE 10 MG/ML
20 INJECTION, SOLUTION INTRAVENOUS AS NEEDED
OUTPATIENT
Start: 2024-02-05

## 2024-01-18 RX ORDER — DIPHENHYDRAMINE HYDROCHLORIDE 50 MG/ML
50 INJECTION INTRAMUSCULAR; INTRAVENOUS AS NEEDED
OUTPATIENT
Start: 2024-02-05

## 2024-01-18 RX ORDER — SODIUM CHLORIDE 9 MG/ML
20 INJECTION, SOLUTION INTRAVENOUS ONCE
OUTPATIENT
Start: 2024-02-19

## 2024-01-18 RX ORDER — ALLOPURINOL 100 MG/1
100 TABLET ORAL DAILY
Qty: 30 TABLET | Refills: 0 | Status: SHIPPED | OUTPATIENT
Start: 2024-01-18

## 2024-01-18 NOTE — PROGRESS NOTES
Telehealth follow-up visit  Done for inclement weather as well as COVID-19 risk reduction.  Verbal consent given.  CHIEF COMPLAINT: Abdominal discomforts and severe fatigue with low-grade bowel lymphoma    Problem List:  Oncology/Hematology History Overview Note   1.  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  Stage IIIa chronic kidney disease      Hematology oncology history timeline:  -9/23/2023 stool cultures negative for pathogens  -10/6/2023 EGD and colonoscopy Dr. Ortiz.  Antral biopsies showed minimal chronic gastritis H. pylori negative.  Transverse colon has a serrated sessile polyp negative for dysplasia.  Ileum biopsy showed atypical lymphoid infiltrate with some monotony.  Extra departmental review by [facility] [place] [facility] consultant agreed with atypical lymphoid infiltrate that would support a low-grade follicular lymphoma but clinical correlation is essential per consultant.  Cells are positive for CD20 and less positive for CD3 and CD5.  Bcl-2 and CD10 strongly positive negative for cyclin D1.  -10/31/2023 CT chest abdomen pelvis with contrast with no worrisome findings for occult neoplasm.  Normal CBC and differential.  CMP unremarkable save for creatinine 1.17 GFR 49.7.  LDH mildly elevated 303 upper limit of normal 214.  -12/5/2023 colonoscopy 4 small ulcerationsin distal tumor ileum showed sigmoid polyp tubular adenoma. No colitis. Terminal ileum biopsy showed atypical lambda restricted clonal lymphoid proliferation consistent with low-grade lymphoproliferative disorder such as low-grade follicular lymphoma.  Addendum: Immunostain for H. pylori and Campylobacter is negative.    -1/4/2024 [facility] medical oncology hematology consult: Patient has felt vaguely poorly for the better part of the year.  Around September started having nausea and occasional vomiting and occasional diarrhea but mostly just progressive fatigue despite no anemia.   Endoscopy in October and again in December showed ulcerations in the distal ileum without colitis but with lambda restricted clonal B-cell low-grade follicular lymphoma.  This is unlikely to be the immunoproliferative alpha heavy chain related MALT lymphoma but to be more likely the nonimmunoproliferative low-grade follicular lymphoma that may be associated with inflammatory bowel though the ileal biopsies do not confirm colitis nor did the CAT scan show any colitis.  PET scans are not generally extremely helpful with low-grade lymphomas but given that we do not have any other easily measurable disease I think it is reasonable to check a PET for that as well as to be sure there is no unexpectedly hot areas that may have discordant histology that may need more aggressive therapy if higher grade.  If the pet does light up vaguely, that would not distinguish inflammatory bowel from low-grade follicular B-cell lymphoma of the small bowel as they would both light up similarly.  I have communicated with Dr. Ortiz to get the ileum biopsy from December stain for H. pylori and Campylobacter jejuni and I will check her CBC, CMP, HIV, hep B, hep C, H. pylori breath test, and alpha heavy chains along with serum immunofixation electrophoresis.  At the end of the day, treatment will largely be guided by her symptoms which while nebulous are significant enough that I suspect we will end up giving her Rituxan in the next month or 2 pending results of these tests.  She understands the common dilemma with low-grade lymphomas of deciding when to treat as there is a very fine risk-benefit ratio balance.She has had frequent upper respiratory infections and I will check her quantitative immunoglobulins as well.    -1/4/2024 data:  CBC normal with white count 5580 hemoglobin 15.4 platelets 204,000 and normal differential.  Glucose 108 creatinine 1.02 GFR 58 chloride 110 alk phos 129 otherwise unremarkable CMP.  Negative H. pylori breath  test.  Normal serum immunoglobulin G/A/M kappa and lambda light chains with normal heavy to light chain ratio.  Normal quantitative immunoglobulins with no serum M spike.  Hepatitis C and B antibodies negative.  HIV 1 and 2 negative.   upper limit of normal 214.  Uric acid 5.9 upper limit of normal 5.7.  Sedimentation rate 19 normal.    -1/11/24 PET showed no hypermetabolism with normal-appearing bowel.    -1/18/2024 [facility] oncology follow-up: I reviewed the above data with the patient.  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  She does have a capsule endoscopy for Tuesday of next week and I have left a message with Dr. 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  In order to know whether or not treatment is effective, she will need repeat endoscopy about 3 months out from treatment as the scans not surprisingly are entirely negative and I do not think we will have measurable disease to assess response either serologically or radiographically.  I have communicated this as well to Dr. Ortiz.  Presuming we proceed with his track I will see her the month out from her final weekly Rituxan.     Grade I follicular lymphoma of extranodal site excluding spleen and  "other solid organs   1/4/2024 Initial Diagnosis    Grade I follicular lymphoma of extranodal site excluding spleen and other solid organs     1/4/2024 Cancer Staged    Staging form: Hodgkin And Non-Hodgkin Lymphoma, AJCC 8th Edition  - Clinical stage from 1/4/2024: Stage IV (Follicular lymphoma) - Signed by En Carballo MD on 1/4/2024 1/29/2024 -  Chemotherapy    OP LYMPHOMA (CLL) RiTUXimab (Weekly X 4)         HISTORY OF PRESENT ILLNESS:  The patient is a 73 y.o. female, here for follow up on management of follicular low-grade lymphoma of the bowel feeling generally quite fatigued and poorly for the better part of the year    History reviewed. No pertinent past medical history.  History reviewed. No pertinent surgical history.    No Known Allergies    Family History and Social History reviewed and changed as necessary    REVIEW OF SYSTEM:   Some mild abdominal pains    PHYSICAL EXAM:  No jaundice icterus or pallor.  No visible adenopathy.  No respiratory distress.    Vitals:    01/18/24 0921   Height: 157.5 cm (62\")       ECOG: (0) Fully Active - Able to Carry On All Pre-disease Performance Without Restriction    Lab Results   Component Value Date    HGB 15.4 01/04/2024    HCT 46.3 01/04/2024    MCV 95.9 01/04/2024     01/04/2024    WBC 5.58 01/04/2024    NEUTROABS 3.40 01/04/2024    LYMPHSABS 1.62 01/04/2024    MONOSABS 0.43 01/04/2024    EOSABS 0.09 01/04/2024    BASOSABS 0.02 01/04/2024       Lab Results   Component Value Date    GLUCOSE 108 (H) 01/04/2024    BUN 11 01/04/2024    CREATININE 1.02 (H) 01/04/2024     01/04/2024    K 3.9 01/04/2024     (H) 01/04/2024    CO2 26.0 01/04/2024    CALCIUM 9.5 01/04/2024    PROTEINTOT 6.8 01/04/2024    ALBUMIN 4.3 01/04/2024    ALBUMIN 3.6 01/04/2024    BILITOT 1.1 01/04/2024    ALKPHOS 129 (H) 01/04/2024    AST 24 01/04/2024    ALT 16 01/04/2024             ASSESSMENT & PLAN:  1.  Follicular low-grade B-cell lymphoma terminal ileum with negative " CT scan chest abdomen pelvis  2.  Stage IIIa chronic kidney disease      Hematology oncology history timeline:  -9/23/2023 stool cultures negative for pathogens  -10/6/2023 EGD and colonoscopy Dr. Ortiz.  Antral biopsies showed minimal chronic gastritis H. pylori negative.  Transverse colon has a serrated sessile polyp negative for dysplasia.  Ileum biopsy showed atypical lymphoid infiltrate with some monotony.  Extra departmental review by [facility] [place] [facility] consultant agreed with atypical lymphoid infiltrate that would support a low-grade follicular lymphoma but clinical correlation is essential per consultant.  Cells are positive for CD20 and less positive for CD3 and CD5.  Bcl-2 and CD10 strongly positive negative for cyclin D1.  -10/31/2023 CT chest abdomen pelvis with contrast with no worrisome findings for occult neoplasm.  Normal CBC and differential.  CMP unremarkable save for creatinine 1.17 GFR 49.7.  LDH mildly elevated 303 upper limit of normal 214.  -12/5/2023 colonoscopy 4 small ulcerationsin distal tumor ileum showed sigmoid polyp tubular adenoma. No colitis. Terminal ileum biopsy showed atypical lambda restricted clonal lymphoid proliferation consistent with low-grade lymphoproliferative disorder such as low-grade follicular lymphoma.  Addendum: Immunostain for H. pylori and Campylobacter is negative.    -1/4/2024 [facility] medical oncology hematology consult: Patient has felt vaguely poorly for the better part of the year.  Around September started having nausea and occasional vomiting and occasional diarrhea but mostly just progressive fatigue despite no anemia.  Endoscopy in October and again in December showed ulcerations in the distal ileum without colitis but with lambda restricted clonal B-cell low-grade follicular lymphoma.  This is unlikely to be the immunoproliferative alpha heavy chain related MALT lymphoma but to be more likely the nonimmunoproliferative low-grade follicular  lymphoma that may be associated with inflammatory bowel though the ileal biopsies do not confirm colitis nor did the CAT scan show any colitis.  PET scans are not generally extremely helpful with low-grade lymphomas but given that we do not have any other easily measurable disease I think it is reasonable to check a PET for that as well as to be sure there is no unexpectedly hot areas that may have discordant histology that may need more aggressive therapy if higher grade.  If the pet does light up vaguely, that would not distinguish inflammatory bowel from low-grade follicular B-cell lymphoma of the small bowel as they would both light up similarly.  I have communicated with Dr. Ortiz to get the ileum biopsy from December stain for H. pylori and Campylobacter jejuni and I will check her CBC, CMP, HIV, hep B, hep C, H. pylori breath test, and alpha heavy chains along with serum immunofixation electrophoresis.  At the end of the day, treatment will largely be guided by her symptoms which while nebulous are significant enough that I suspect we will end up giving her Rituxan in the next month or 2 pending results of these tests.  She understands the common dilemma with low-grade lymphomas of deciding when to treat as there is a very fine risk-benefit ratio balance.She has had frequent upper respiratory infections and I will check her quantitative immunoglobulins as well.    -1/4/2024 data:  CBC normal with white count 5580 hemoglobin 15.4 platelets 204,000 and normal differential.  Glucose 108 creatinine 1.02 GFR 58 chloride 110 alk phos 129 otherwise unremarkable CMP.  Negative H. pylori breath test.  Normal serum immunoglobulin G/A/M kappa and lambda light chains with normal heavy to light chain ratio.  Normal quantitative immunoglobulins with no serum M spike.  Hepatitis C and B antibodies negative.  HIV 1 and 2 negative.   upper limit of normal 214.  Uric acid 5.9 upper limit of normal 5.7.  Sedimentation  rate 19 normal.    -1/11/24 PET showed no hypermetabolism with normal-appearing bowel.    -1/18/2024 [facility] oncology telemedicine follow-up: I reviewed the above data with the patient.  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  She does have a capsule endoscopy for Tuesday of next week and I have left a message with Dr. 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  In order to know whether or not treatment is effective, she will need repeat endoscopy about 3 months out from treatment as the scans not surprisingly are entirely negative and I do not think we will have measurable disease to assess response either serologically or radiographically.  I have communicated this as well to Dr. Ortiz.  Presuming we proceed with his track I will see her the month out from her final weekly Rituxan.  Odds of high-grade lymphoma would seem unlikely with minimally elevated LDH and uric acid but I will ask my nurse  to give uric acid depleting allopurinol prescription.    Total time of care today inclusive of time spent today prior to patient's arrival reviewing interval images and reports thereof and interval data as outlined above and during visit interviewing patient as to signs  and symptoms of her disease and translating the information to her from above and going over the risk-benefit ratios and educating her and setting up this plan took 1 hour patient care time throughout the day today.      En Carballo MD    01/18/2024

## 2024-01-26 ENCOUNTER — LAB (OUTPATIENT)
Dept: LAB | Facility: HOSPITAL | Age: 74
End: 2024-01-26
Payer: COMMERCIAL

## 2024-01-26 ENCOUNTER — HOSPITAL ENCOUNTER (OUTPATIENT)
Dept: ONCOLOGY | Facility: HOSPITAL | Age: 74
Discharge: HOME | End: 2024-01-26
Payer: COMMERCIAL

## 2024-01-26 ENCOUNTER — SPECIALTY PHARMACY (OUTPATIENT)
Dept: ONCOLOGY | Facility: HOSPITAL | Age: 74
End: 2024-01-26
Payer: COMMERCIAL

## 2024-01-26 ENCOUNTER — OFFICE VISIT (OUTPATIENT)
Dept: ONCOLOGY | Facility: CLINIC | Age: 74
End: 2024-01-26
Payer: COMMERCIAL

## 2024-01-26 ENCOUNTER — APPOINTMENT (OUTPATIENT)
Dept: ONCOLOGY | Facility: HOSPITAL | Age: 74
End: 2024-01-26
Payer: COMMERCIAL

## 2024-01-26 VITALS
TEMPERATURE: 96.4 F | HEIGHT: 62 IN | WEIGHT: 162 LBS | SYSTOLIC BLOOD PRESSURE: 130 MMHG | DIASTOLIC BLOOD PRESSURE: 81 MMHG | HEART RATE: 88 BPM | BODY MASS INDEX: 29.81 KG/M2 | RESPIRATION RATE: 16 BRPM | OXYGEN SATURATION: 98 %

## 2024-01-26 DIAGNOSIS — C82.09 GRADE I FOLLICULAR LYMPHOMA OF EXTRANODAL SITE EXCLUDING SPLEEN AND OTHER SOLID ORGANS: ICD-10-CM

## 2024-01-26 DIAGNOSIS — C82.09 GRADE I FOLLICULAR LYMPHOMA OF EXTRANODAL SITE EXCLUDING SPLEEN AND OTHER SOLID ORGANS: Primary | ICD-10-CM

## 2024-01-26 LAB
ALBUMIN SERPL-MCNC: 3.8 G/DL (ref 3.5–5.2)
ALBUMIN/GLOB SERPL: 1.3 G/DL
ALP SERPL-CCNC: 106 U/L (ref 39–117)
ALT SERPL W P-5'-P-CCNC: 17 U/L (ref 1–33)
ANION GAP SERPL CALCULATED.3IONS-SCNC: 12 MMOL/L (ref 5–15)
AST SERPL-CCNC: 23 U/L (ref 1–32)
BASOPHILS # BLD AUTO: 0.04 10*3/MM3 (ref 0–0.2)
BASOPHILS NFR BLD AUTO: 0.6 % (ref 0–1.5)
BILIRUB SERPL-MCNC: 0.8 MG/DL (ref 0–1.2)
BUN SERPL-MCNC: 13 MG/DL (ref 8–23)
BUN/CREAT SERPL: 16.5 (ref 7–25)
CALCIUM SPEC-SCNC: 9.2 MG/DL (ref 8.6–10.5)
CHLORIDE SERPL-SCNC: 107 MMOL/L (ref 98–107)
CO2 SERPL-SCNC: 24 MMOL/L (ref 22–29)
CREAT SERPL-MCNC: 0.79 MG/DL (ref 0.57–1)
DEPRECATED RDW RBC AUTO: 42.4 FL (ref 37–54)
EGFRCR SERPLBLD CKD-EPI 2021: 79.1 ML/MIN/1.73
EOSINOPHIL # BLD AUTO: 0.07 10*3/MM3 (ref 0–0.4)
EOSINOPHIL NFR BLD AUTO: 1 % (ref 0.3–6.2)
ERYTHROCYTE [DISTWIDTH] IN BLOOD BY AUTOMATED COUNT: 11.8 % (ref 12.3–15.4)
GLOBULIN UR ELPH-MCNC: 3 GM/DL
GLUCOSE SERPL-MCNC: 103 MG/DL (ref 65–99)
HBV SURFACE AB SER RIA-ACNC: REACTIVE
HBV SURFACE AG SERPL QL IA: NORMAL
HCT VFR BLD AUTO: 46 % (ref 34–46.6)
HGB BLD-MCNC: 15.2 G/DL (ref 12–15.9)
IMM GRANULOCYTES # BLD AUTO: 0.01 10*3/MM3 (ref 0–0.05)
IMM GRANULOCYTES NFR BLD AUTO: 0.1 % (ref 0–0.5)
LYMPHOCYTES # BLD AUTO: 1.95 10*3/MM3 (ref 0.7–3.1)
LYMPHOCYTES NFR BLD AUTO: 27.2 % (ref 19.6–45.3)
MCH RBC QN AUTO: 31.8 PG (ref 26.6–33)
MCHC RBC AUTO-ENTMCNC: 33 G/DL (ref 31.5–35.7)
MCV RBC AUTO: 96.2 FL (ref 79–97)
MONOCYTES # BLD AUTO: 0.53 10*3/MM3 (ref 0.1–0.9)
MONOCYTES NFR BLD AUTO: 7.4 % (ref 5–12)
NEUTROPHILS NFR BLD AUTO: 4.57 10*3/MM3 (ref 1.7–7)
NEUTROPHILS NFR BLD AUTO: 63.7 % (ref 42.7–76)
PLATELET # BLD AUTO: 193 10*3/MM3 (ref 140–450)
PMV BLD AUTO: 10.1 FL (ref 6–12)
POTASSIUM SERPL-SCNC: 4.1 MMOL/L (ref 3.5–5.2)
PROT SERPL-MCNC: 6.8 G/DL (ref 6–8.5)
RBC # BLD AUTO: 4.78 10*6/MM3 (ref 3.77–5.28)
SODIUM SERPL-SCNC: 143 MMOL/L (ref 136–145)
WBC NRBC COR # BLD AUTO: 7.17 10*3/MM3 (ref 3.4–10.8)

## 2024-01-26 PROCEDURE — 85025 COMPLETE CBC W/AUTO DIFF WBC: CPT

## 2024-01-26 PROCEDURE — 86706 HEP B SURFACE ANTIBODY: CPT

## 2024-01-26 PROCEDURE — 99214 OFFICE O/P EST MOD 30 MIN: CPT | Performed by: NURSE PRACTITIONER

## 2024-01-26 PROCEDURE — 36415 COLL VENOUS BLD VENIPUNCTURE: CPT

## 2024-01-26 PROCEDURE — 80053 COMPREHEN METABOLIC PANEL: CPT

## 2024-01-26 PROCEDURE — 86704 HEP B CORE ANTIBODY TOTAL: CPT

## 2024-01-26 PROCEDURE — 87340 HEPATITIS B SURFACE AG IA: CPT

## 2024-01-26 NOTE — PROGRESS NOTES
[facility]  [address] [place] [address]  [phone]      CHEMOTHERAPY EDUCATION    NAME:  Cata Hidalgo      : 1950           DATE: 24    Medication Education Sheets: (select all that apply)  Rituximab    Other Education Sheets: (select all that apply)  CINV and Symptom Tracker Sheet and ANTONIO Information    Chemotherapy Regimen:   OP LYMPHOMA (CLL) RiTUXimab (Weekly X 4)    TOPICS EDUCATION PROVIDED COMMENTS   NEUTROPENIA:  role of WBC, cause, infection precautions, s/s of infection, when to call MD [x] Reviewed the role of WBC, good infection prevention practices, and when to call the clinic (temperature 100.4F, sore throat, burning urination, etc).   NUTRITION & APPETITE CHANGES:  importance of maintaining healthy diet & weight, ways to manage to improve intake, dietary consult, exercise regimen, electrolyte and/or blood glucose abnormalities [x] Electrolyte Abnormalities:  Explained that the oncology therapy may lead to abnormalities in electrolytes as a result of tumor lysis syndrome. Patient will start allopurinol and remain well-hydrated throughout the course of treatment.   NAUSEA & VOMITING:  cause, use of antiemetics, dietary changes, when to call MD [x] Emetic risk: Minimal  PRN home meds: Ondansetron 8mg PO TID PRN N/V  Pharmacy home meds sent to: [facility] in [place]    Instructed the patient to take a dose of the PRN medication at the first onset of nausea and if it's not working to call us for additional medications.  Also provided non-drug measures to mitigate nausea.   MOUTH SORES:  causes, oral care, ways to manage [x] Mouth sores can be prevented by making a mouth wash mixture of salt, baking soda, and water. The patient was instructed to swish and spit four times daily after meals and before bedtime. Also recommended using a soft bristle toothbrush and avoiding alcohol-containing OTC mouthwashes.    SKIN & NAIL  CHANGES:  cause, s/s, ways to manage [x] Discussed the potential for a rash or itchy skin from therapy, offered nonpharmacologic prevention strategies, and instructed the patient to call if a rash develops and worsens. Also reviewed the rare possibility of a severe skin rash and encouraged immediate follow up if that occurs.   ORGAN TOXICITIES:  cause, s/s, need for diagnostic tests, labs, when to notify MD [x] Discussed potential effects on organ systems, monitoring, diagnostic tests, labs, and when to notify the clinic. Discussed the signs/symptoms of the following: cardiotoxicity, GI perforation, nephrotoxicity, delayed neurotoxicity (progressive multifocal leukoencephalopathy (PML)), and skin changes.   INFUSION RELATED REACTIONS or INJECTION-SITE REACTION:  Cause, s/s, anaphylaxis, monitoring, etc. [x] Premeds: Acetaminophen and Diphenhydramine    Discussed the risk of an infusion reaction and symptoms such as: fever, chills, dizziness, itchiness or rash, flushing, trouble breathing, wheezing, sudden back pain, or feeling faint.  Instructed the patient to notify her nurse if she starts feeling weird at any point during her infusion.    Discussed the rate of infusion will be slower with the initial dose and can be increased for subsequent doses if the patient tolerated the first dose without a reaction    Reviewed how infusion reactions are managed.   MISCELLANEOUS:  drug interactions, administration, labs, etc. [x] Discussed chemotherapy schedule, lab draws, infusion times, and total expected visit time.   DDIs: No significant DDIs  Lab draws:  weekly, prior to each dose   INFERTILITY & SEXUALITY:    causes, fertility preservation options, sexuality changes, ways to manage, importance of birth control [x] IV Oncology Therapy: Reviewed safe sex practices and the importance of minimizing exposure to body fluids for 48 hours after each dose of IV oncology therapy. The patient is not of childbearing potential.    HOME CARE:  storing of oral chemo, how to manage bodily fluids [x] IV - Counseled on management of soiled linens and proper flush technique.  Discussed how to manage all the side effects at home and advised when to contact the MD office.     Medications:  Prior to Admission medications    Medication Sig Start Date End Date Taking? Authorizing Provider   allopurinol (ZYLOPRIM) 100 MG tablet Take 1 tablet by mouth Daily. 1/18/24   En Carbalol MD   amLODIPine (NORVASC) 10 MG tablet Take 1 tablet by mouth Daily.    Deonte Childs MD   esomeprazole (nexIUM) 40 MG capsule Take 1 capsule by mouth 2 (Two) Times a Day. 9/8/23   Deonte Childs MD   famotidine (PEPCID) 20 MG tablet Take 1 tablet by mouth 2 (Two) Times a Day.    Deonte Childs MD   levocetirizine (XYZAL) 5 MG tablet Take 1 tablet by mouth Every Evening. 1/9/24   Deonte Childs MD   ondansetron (ZOFRAN) 8 MG tablet Take 1 tablet by mouth 3 (Three) Times a Day As Needed for Nausea or Vomiting. 1/18/24   En Carballo MD   simvastatin (ZOCOR) 40 MG tablet Take 1 tablet by mouth Every Night.    Deonte Childs MD   Trintellix 10 MG tablet tablet Take 1 tablet by mouth Daily. 8/1/23   Deonte Childs MD   vitamin D (ERGOCALCIFEROL) 1.25 MG (77555 UT) capsule capsule Take 1 capsule by mouth Every 7 (Seven) Days. 11/9/23   Deonte Childs MD   ondansetron (ZOFRAN) 4 MG tablet Take 1 tablet by mouth Every 8 (Eight) Hours As Needed for Nausea or Vomiting. 7/1/23 1/18/24  Deonte Childs MD     Notes: All questions and concerns were addressed. Provided a personalized treatment calendar to patient (includes treatment and lab schedule). Provided patient with contact information for the pharmacist and clinic while instructing her to call if any questions or concerns arise. Informed consent for treatment was obtained. Patient and her  were both receptive to information and expressed understanding.    Ann Cowden  Jeremi PharmD, BCPS  Oncology Clinical Pharmacist  Phone [phone]    1/26/2024  12:44 EST

## 2024-01-26 NOTE — PROGRESS NOTES
CHEMOTHERAPY PREPARATION    Cata Hidalgo  [id number]  1950    Chief Complaint: Treatment preparation and needs assessment    History of present illness:  Cata Hidalgo is a 73 y.o. year old female who is here today for treatment preparation and needs assessment.  The patient has been diagnosed with lymphoma and is scheduled to begin treatment with RiTUXimab (Weekly X 4).     Oncology History:    Oncology/Hematology History Overview Note   1.  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  Stage IIIa chronic kidney disease      Hematology oncology history timeline:  -9/23/2023 stool cultures negative for pathogens  -10/6/2023 EGD and colonoscopy Dr. Ortiz.  Antral biopsies showed minimal chronic gastritis H. pylori negative.  Transverse colon has a serrated sessile polyp negative for dysplasia.  Ileum biopsy showed atypical lymphoid infiltrate with some monotony.  Extra departmental review by [facility] [place] [facility] consultant agreed with atypical lymphoid infiltrate that would support a low-grade follicular lymphoma but clinical correlation is essential per consultant.  Cells are positive for CD20 and less positive for CD3 and CD5.  Bcl-2 and CD10 strongly positive negative for cyclin D1.  -10/31/2023 CT chest abdomen pelvis with contrast with no worrisome findings for occult neoplasm.  Normal CBC and differential.  CMP unremarkable save for creatinine 1.17 GFR 49.7.  LDH mildly elevated 303 upper limit of normal 214.  -12/5/2023 colonoscopy 4 small ulcerationsin distal tumor ileum showed sigmoid polyp tubular adenoma. No colitis. Terminal ileum biopsy showed atypical lambda restricted clonal lymphoid proliferation consistent with low-grade lymphoproliferative disorder such as low-grade follicular lymphoma.  Addendum: Immunostain for H. pylori and Campylobacter is negative.    -1/4/2024 [facility], [place], operated by [facility] medical oncology hematology consult: Patient has felt  vaguely poorly for the better part of the year.  Around September started having nausea and occasional vomiting and occasional diarrhea but mostly just progressive fatigue despite no anemia.  Endoscopy in October and again in December showed ulcerations in the distal ileum without colitis but with lambda restricted clonal B-cell low-grade follicular lymphoma.  This is unlikely to be the immunoproliferative alpha heavy chain related MALT lymphoma but to be more likely the nonimmunoproliferative low-grade follicular lymphoma that may be associated with inflammatory bowel though the ileal biopsies do not confirm colitis nor did the CAT scan show any colitis.  PET scans are not generally extremely helpful with low-grade lymphomas but given that we do not have any other easily measurable disease I think it is reasonable to check a PET for that as well as to be sure there is no unexpectedly hot areas that may have discordant histology that may need more aggressive therapy if higher grade.  If the pet does light up vaguely, that would not distinguish inflammatory bowel from low-grade follicular B-cell lymphoma of the small bowel as they would both light up similarly.  I have communicated with Dr. Ortiz to get the ileum biopsy from December stain for H. pylori and Campylobacter jejuni and I will check her CBC, CMP, HIV, hep B, hep C, H. pylori breath test, and alpha heavy chains along with serum immunofixation electrophoresis.  At the end of the day, treatment will largely be guided by her symptoms which while nebulous are significant enough that I suspect we will end up giving her Rituxan in the next month or 2 pending results of these tests.  She understands the common dilemma with low-grade lymphomas of deciding when to treat as there is a very fine risk-benefit ratio balance.She has had frequent upper respiratory infections and I will check her quantitative immunoglobulins as well.    -1/4/2024 data:  CBC normal with  white count 5580 hemoglobin 15.4 platelets 204,000 and normal differential.  Glucose 108 creatinine 1.02 GFR 58 chloride 110 alk phos 129 otherwise unremarkable CMP.  Negative H. pylori breath test.  Normal serum immunoglobulin G/A/M kappa and lambda light chains with normal heavy to light chain ratio.  Normal quantitative immunoglobulins with no serum M spike.  Hepatitis C and B antibodies negative.  HIV 1 and 2 negative.   upper limit of normal 214.  Uric acid 5.9 upper limit of normal 5.7.  Sedimentation rate 19 normal.    -1/11/24 PET showed no hypermetabolism with normal-appearing bowel.    -1/18/2024 [facility] oncology telemedicine follow-up: I reviewed the above data with the patient.  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  She does have a capsule endoscopy for Tuesday of next week and I have left a message with Dr. 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  In order to know whether or not treatment is effective, she will need repeat endoscopy about 3 months out from treatment as the scans not surprisingly are entirely negative and I do not think we will have measurable disease to assess response either serologically or radiographically.  I have  communicated this as well to Dr. Ortiz.  Presuming we proceed with his track I will see her the month out from her final weekly Rituxan. Odds of high-grade lymphoma would seem unlikely with minimally elevated LDH and uric acid but I will ask my nurse  to give uric acid depleting allopurinol prescription.     Grade I follicular lymphoma of extranodal site excluding spleen and other solid organs   1/4/2024 Initial Diagnosis    Grade I follicular lymphoma of extranodal site excluding spleen and other solid organs     1/4/2024 Cancer Staged    Staging form: Hodgkin And Non-Hodgkin Lymphoma, AJCC 8th Edition  - Clinical stage from 1/4/2024: Stage IV (Follicular lymphoma) - Signed by En Carballo MD on 1/4/2024 1/29/2024 -  Chemotherapy    OP LYMPHOMA (CLL) RiTUXimab (Weekly X 4)         The current medication list and allergy list were reviewed and reconciled.     Past Medical History, Past Surgical History, Social History, Family History have been reviewed and are without significant changes except as mentioned.    Review of Systems:    Review of Systems   Constitutional:  Positive for fatigue. Negative for appetite change, fever and unexpected weight change.   HENT:  Negative for mouth sores, sore throat and trouble swallowing.    Respiratory:  Negative for cough, shortness of breath and wheezing.    Cardiovascular:  Negative for chest pain, palpitations and leg swelling.   Gastrointestinal:  Negative for abdominal distention, abdominal pain, constipation, diarrhea, nausea and vomiting.   Genitourinary:  Negative for difficulty urinating, dysuria and frequency.   Musculoskeletal:  Negative for arthralgias.   Skin:  Negative for pallor, rash and wound.   Neurological:  Negative for dizziness and weakness.   Hematological:  Does not bruise/bleed easily.   Psychiatric/Behavioral:  Negative for confusion and sleep disturbance. The patient is nervous/anxious.      Physical Exam:    Vitals:    01/26/24 1353   BP:  130/81   Pulse: 88   Resp: 16   Temp: 96.4 °F (35.8 °C)   SpO2: 98%     Vitals:    01/26/24 1353   PainSc: 0-No pain          ECOG: (0) Fully Active - Able to Carry On All Pre-disease Performance Without Restriction    General: well appearing, in no acute distress  Cardiovascular: regular rate and rhythm, no murmurs noted  Lungs: clear to auscultation bilaterally, respirations even and unlabored  Extremities: no lower extremity edema  Skin: no rashes, lesions, bruising, or petechiae  Psych: Mood is stable        NEEDS ASSESSMENTS    Genetics  The patient's new diagnosis and family history have been reviewed for genetic counseling needs. A genetic referral is not recommended.     Psychosocial  The patient has completed a PHQ-9 Depression Screening and the Distress Thermometer (DT) today.   PHQ-9 results show 5-9 (Mild Depression). The patient scored their distress today as 3 on a scale of 0-10 with 0 being no distress and 10 being extreme distress.   Problems marked by the patient as being an issue for them within the last week include practical problems, emotional problems, and physical problems.   Results were reviewed along with psychosocial resources offered by our cancer center.  Our oncology social worker will be flagged for a DT score of 4 or above, and a same day call will be made for a score of 9 or 10.  A mental health referral is offered at this time. The patient is not interested in a referral to [facility].   Copies of patient's questionnaires will be scanned into EMR for details and further reference.    Barriers to care  A barriers form was also completed by the patient today. We discussed services offered by our facility to help her have adequate access to care. The patient was given the name and contact information for our Oncology Social Worker, Kristan Villalpando.  Based upon barriers assessment today, the patient will not require a follow-up call from the  to further discuss needs.  "  A copy of the barriers form will also be scanned into EMR for details and further reference.     VAD Assessment  The patient and I discussed planned intervenous chemotherapy as well as other IV treatments that are often needed throughout the course of treatment. These may include, but are not limited to blood transfusions, antibiotics, and IV hydration. The vasculature does not appear to be adequate for multiple peripheral IVs throughout their treatment course.     Advanced Care Planning  The patient and I discussed advanced care planning, \"Conversations that Matter\".   This service was offered, free of charge, for development of advance directives with a certified ACP facilitator.  The patient does not have an up-to-date advanced directive. This document is not on file with our office. The patient is not interested in an appointment with one of our facilitators to create or update their advanced directives.      Palliative Care  The patient and I discussed palliative care services. Palliative care is not the same as Hospice care. This is specialized medical care for people living with serious illness with the goal of improving quality of life for the patient and their family. [facility] offers our patients outpatient palliative care early along with their treatment to assist in coordination of care, symptom management, pain management, and medical decision making.  Oncology criteria for palliative care referral is not met at this time. The patient is not interested in a palliative care consultation.     Additional Referral needs  none    CHEMOTHERAPY EDUCATION    Chemotherapy education completed and consent obtained per oncology pharmacist.  See their documentation for further details.    Booklets Given: Chemotherapy and You [x]  Nutrition for the Patient with Cancer During Treatment [x]    Sexuality/Fertility Books []     Chemotherapy Regimen:   Treatment Plans       Name Type Plan Dates Plan Provider         " Active    OP LYMPHOMA (CLL) RiTUXimab (Weekly X 4) ONCOLOGY TREATMENT  1/28/2024 - Present En Carballo MD                    Chemotherapy education comprehension reviewed.     Assessment and Plan:    Diagnoses and all orders for this visit:    1. Grade I follicular lymphoma of extranodal site excluding spleen and other solid organs (Primary)    The patient and I have reviewed their cancer diagnosis and scheduled treatment plan. Needs assessment was completed including genetics, psychosocial needs, barriers to care, VAD evaluation, advanced care planning, and palliative care services. Referrals have been ordered as appropriate based upon our evaluation and patient desires.     Chemotherapy teaching was also completed today per pharmacy.  Adequate time was given to answer questions.  Patient and family are aware of their care team members and contact information if they have questions or problems throughout the treatment course. The patient is adequately prepared to begin treatment as scheduled on 1/29/2024.     Reviewed with patient education regarding Zofran prescriptions sent to pharmacy.       Patient will have pretreatment labs drawn today.    I spent 30 minutes caring for Cata on this date of service. This time includes time spent by me in the following activities: preparing for the visit, reviewing tests, counseling and educating the patient/family/caregiver, referring and communicating with other health care professionals, documenting information in the medical record, and care coordination.     Gauri Liu, APRN  01/26/24

## 2024-01-27 LAB — HBV CORE AB SERPL QL IA: NEGATIVE

## 2024-01-29 ENCOUNTER — DOCUMENTATION (OUTPATIENT)
Dept: NUTRITION | Facility: HOSPITAL | Age: 74
End: 2024-01-29
Payer: COMMERCIAL

## 2024-01-29 ENCOUNTER — HOSPITAL ENCOUNTER (OUTPATIENT)
Dept: ONCOLOGY | Facility: HOSPITAL | Age: 74
Discharge: HOME | End: 2024-01-29
Admitting: INTERNAL MEDICINE
Payer: COMMERCIAL

## 2024-01-29 VITALS
BODY MASS INDEX: 30.36 KG/M2 | HEIGHT: 62 IN | HEART RATE: 85 BPM | TEMPERATURE: 97.5 F | WEIGHT: 165 LBS | SYSTOLIC BLOOD PRESSURE: 130 MMHG | RESPIRATION RATE: 18 BRPM | DIASTOLIC BLOOD PRESSURE: 54 MMHG

## 2024-01-29 DIAGNOSIS — C82.09 GRADE I FOLLICULAR LYMPHOMA OF EXTRANODAL SITE EXCLUDING SPLEEN AND OTHER SOLID ORGANS: Primary | ICD-10-CM

## 2024-01-29 PROCEDURE — 25010000002 RITUXIMAB 10 MG/ML SOLUTION 10 ML VIAL: Performed by: INTERNAL MEDICINE

## 2024-01-29 PROCEDURE — 25810000003 SODIUM CHLORIDE 0.9 % SOLUTION 250 ML FLEX CONT: Performed by: INTERNAL MEDICINE

## 2024-01-29 PROCEDURE — 96375 TX/PRO/DX INJ NEW DRUG ADDON: CPT

## 2024-01-29 PROCEDURE — 25810000003 SODIUM CHLORIDE 0.9 % SOLUTION: Performed by: INTERNAL MEDICINE

## 2024-01-29 PROCEDURE — 96413 CHEMO IV INFUSION 1 HR: CPT

## 2024-01-29 PROCEDURE — 25010000002 RITUXIMAB 10 MG/ML SOLUTION 50 ML VIAL: Performed by: INTERNAL MEDICINE

## 2024-01-29 PROCEDURE — 25010000002 DIPHENHYDRAMINE PER 50 MG: Performed by: INTERNAL MEDICINE

## 2024-01-29 PROCEDURE — 96415 CHEMO IV INFUSION ADDL HR: CPT

## 2024-01-29 RX ORDER — SODIUM CHLORIDE 9 MG/ML
20 INJECTION, SOLUTION INTRAVENOUS ONCE
Status: COMPLETED | OUTPATIENT
Start: 2024-01-29 | End: 2024-01-29

## 2024-01-29 RX ORDER — ACETAMINOPHEN 325 MG/1
650 TABLET ORAL ONCE
Status: COMPLETED | OUTPATIENT
Start: 2024-01-29 | End: 2024-01-29

## 2024-01-29 RX ADMIN — DIPHENHYDRAMINE HYDROCHLORIDE 50 MG: 50 INJECTION INTRAMUSCULAR; INTRAVENOUS at 08:55

## 2024-01-29 RX ADMIN — RITUXIMAB 700 MG: 10 INJECTION, SOLUTION INTRAVENOUS at 09:34

## 2024-01-29 RX ADMIN — ACETAMINOPHEN 650 MG: 325 TABLET ORAL at 08:54

## 2024-01-29 RX ADMIN — SODIUM CHLORIDE 20 ML/HR: 9 INJECTION, SOLUTION INTRAVENOUS at 08:40

## 2024-01-29 NOTE — PROGRESS NOTES
"Outpatient Oncology Nutrition     Reason for Visit: Oncology Nutrition Screening and Patient Education / Met with patient during her initial infusion appointment.    Patient Name:  Cata Hidalgo    :  1950    MRN:  [id number]    Date of Encounter: 2024    Nutrition Assessment     Diagnosis:  Follicular low-grade B-cell lymphoma terminal ileum     Chemotherapy: OP LYMPHOMA RiTUXimab - Weekly X 4    Patient Active Problem List:    Patient Active Problem List   Diagnosis    Grade I follicular lymphoma of extranodal site excluding spleen and other solid organs       Food / Nutrition Related History       Hydration Status   Discussed the importance of hydration, reviewed hydrating fluids, and recommended she increase her intake.    Goal: 80 ounces     How many 8 ounces glasses of water do you consume per day? Patient reports she stopped drinking Dr. Pepper last week and is now drinking mostly water and some juice.    Enteral Feeding       Anthropometric Measurements     Height:    Ht Readings from Last 1 Encounters:   24 157.5 cm (62\")       Weight:    Wt Readings from Last 1 Encounters:   24 74.8 kg (165 lb)       BMI: 30.2 - Obese    Weight Change: weight has been stable     Review of Lab Data (Time Frame - 1 month / 2 month)   Labs reviewed - 24     Medication Review   MAR reviewed - Vitamin D and Nexium noted     Nutrition Focused Physical Findings       Nutrition Impact Symptoms   Nausea - controlled with Zofran  Diarrhea - occasionally   Altered appetite - eating fairly normally as she knows she needs the nutrition     Physical Activity   Normal with no limitations    Current Nutritional Intake     Oral diet:  Regular     Intake: oral intake is less than normal     Malnutrition Risk Assessment     Recent weight loss over the past 6 months:  0 = No    Eating poorly because of a decreased appetite:  1 = Yes    Malnutrition Screening Score:     MST = 0 or 1 Patient not at risk for " malnutrition    Nutrition Diagnosis     Problem    Etiology    Signs / Symptoms      Nutrition Intervention   Discussed the importance of good nutrition during her treatment course focusing on adequate calorie, protein, nutrient and fluid intake.  Advised her to be consuming smaller more frequent meals/snacks throughout the day to aid with nausea and diarrhea symptom management.  Emphasized the importance of protein and its role in the diet; reviewed high protein foods; and recommended she have a protein source at each meal/snack.  Offered several high protein snack ideas she may find more appealing at this time.  She denies the need for written diet materials at this time.     Goal   To achieve adequate nutritional and hydration intake.  To aid with nutrition impact symptom management as needed.     Monitoring / Evaluation   Answered her questions and she voiced understanding of information discussed.  RD's contact information provided and encouraged to call with questions.  Will follow up as indicated.     Sandie Lange MS, RD, LD

## 2024-02-05 ENCOUNTER — HOSPITAL ENCOUNTER (OUTPATIENT)
Dept: ONCOLOGY | Facility: HOSPITAL | Age: 74
Discharge: HOME | End: 2024-02-05
Admitting: INTERNAL MEDICINE
Payer: COMMERCIAL

## 2024-02-05 VITALS
DIASTOLIC BLOOD PRESSURE: 67 MMHG | HEIGHT: 62 IN | WEIGHT: 164 LBS | RESPIRATION RATE: 18 BRPM | HEART RATE: 79 BPM | BODY MASS INDEX: 30.18 KG/M2 | SYSTOLIC BLOOD PRESSURE: 154 MMHG | TEMPERATURE: 97.3 F

## 2024-02-05 DIAGNOSIS — C82.09 GRADE I FOLLICULAR LYMPHOMA OF EXTRANODAL SITE EXCLUDING SPLEEN AND OTHER SOLID ORGANS: Primary | ICD-10-CM

## 2024-02-05 LAB
BASOPHILS # BLD AUTO: 0.03 10*3/MM3 (ref 0–0.2)
BASOPHILS NFR BLD AUTO: 0.5 % (ref 0–1.5)
DEPRECATED RDW RBC AUTO: 42.9 FL (ref 37–54)
EOSINOPHIL # BLD AUTO: 0.12 10*3/MM3 (ref 0–0.4)
EOSINOPHIL NFR BLD AUTO: 1.8 % (ref 0.3–6.2)
ERYTHROCYTE [DISTWIDTH] IN BLOOD BY AUTOMATED COUNT: 11.8 % (ref 12.3–15.4)
HCT VFR BLD AUTO: 45.5 % (ref 34–46.6)
HGB BLD-MCNC: 14.9 G/DL (ref 12–15.9)
IMM GRANULOCYTES # BLD AUTO: 0.02 10*3/MM3 (ref 0–0.05)
IMM GRANULOCYTES NFR BLD AUTO: 0.3 % (ref 0–0.5)
LYMPHOCYTES # BLD AUTO: 1.63 10*3/MM3 (ref 0.7–3.1)
LYMPHOCYTES NFR BLD AUTO: 25.1 % (ref 19.6–45.3)
MCH RBC QN AUTO: 31.4 PG (ref 26.6–33)
MCHC RBC AUTO-ENTMCNC: 32.7 G/DL (ref 31.5–35.7)
MCV RBC AUTO: 95.8 FL (ref 79–97)
MONOCYTES # BLD AUTO: 0.49 10*3/MM3 (ref 0.1–0.9)
MONOCYTES NFR BLD AUTO: 7.6 % (ref 5–12)
NEUTROPHILS NFR BLD AUTO: 4.2 10*3/MM3 (ref 1.7–7)
NEUTROPHILS NFR BLD AUTO: 64.7 % (ref 42.7–76)
PLATELET # BLD AUTO: 203 10*3/MM3 (ref 140–450)
PMV BLD AUTO: 10.2 FL (ref 6–12)
RBC # BLD AUTO: 4.75 10*6/MM3 (ref 3.77–5.28)
WBC NRBC COR # BLD AUTO: 6.49 10*3/MM3 (ref 3.4–10.8)

## 2024-02-05 PROCEDURE — 96415 CHEMO IV INFUSION ADDL HR: CPT

## 2024-02-05 PROCEDURE — 25010000002 RITUXIMAB 10 MG/ML SOLUTION 10 ML VIAL: Performed by: INTERNAL MEDICINE

## 2024-02-05 PROCEDURE — 96365 THER/PROPH/DIAG IV INF INIT: CPT

## 2024-02-05 PROCEDURE — 25810000003 SODIUM CHLORIDE 0.9 % SOLUTION: Performed by: INTERNAL MEDICINE

## 2024-02-05 PROCEDURE — 96366 THER/PROPH/DIAG IV INF ADDON: CPT

## 2024-02-05 PROCEDURE — 96375 TX/PRO/DX INJ NEW DRUG ADDON: CPT

## 2024-02-05 PROCEDURE — 25010000002 RITUXIMAB 10 MG/ML SOLUTION 50 ML VIAL: Performed by: INTERNAL MEDICINE

## 2024-02-05 PROCEDURE — 25810000003 SODIUM CHLORIDE 0.9 % SOLUTION 250 ML FLEX CONT: Performed by: INTERNAL MEDICINE

## 2024-02-05 PROCEDURE — 85025 COMPLETE CBC W/AUTO DIFF WBC: CPT | Performed by: INTERNAL MEDICINE

## 2024-02-05 PROCEDURE — 25010000002 DIPHENHYDRAMINE PER 50 MG: Performed by: INTERNAL MEDICINE

## 2024-02-05 PROCEDURE — 96413 CHEMO IV INFUSION 1 HR: CPT

## 2024-02-05 RX ORDER — ACETAMINOPHEN 325 MG/1
650 TABLET ORAL ONCE
Status: COMPLETED | OUTPATIENT
Start: 2024-02-05 | End: 2024-02-05

## 2024-02-05 RX ORDER — SODIUM CHLORIDE 9 MG/ML
20 INJECTION, SOLUTION INTRAVENOUS ONCE
Status: COMPLETED | OUTPATIENT
Start: 2024-02-05 | End: 2024-02-05

## 2024-02-05 RX ADMIN — SODIUM CHLORIDE 20 ML/HR: 9 INJECTION, SOLUTION INTRAVENOUS at 08:42

## 2024-02-05 RX ADMIN — ACETAMINOPHEN 650 MG: 325 TABLET ORAL at 08:42

## 2024-02-05 RX ADMIN — RITUXIMAB 700 MG: 10 INJECTION, SOLUTION INTRAVENOUS at 09:15

## 2024-02-05 RX ADMIN — DIPHENHYDRAMINE HYDROCHLORIDE 25 MG: 50 INJECTION INTRAMUSCULAR; INTRAVENOUS at 08:43

## 2024-02-12 ENCOUNTER — HOSPITAL ENCOUNTER (OUTPATIENT)
Dept: ONCOLOGY | Facility: HOSPITAL | Age: 74
Discharge: HOME | End: 2024-02-12
Admitting: INTERNAL MEDICINE
Payer: COMMERCIAL

## 2024-02-12 ENCOUNTER — DOCUMENTATION (OUTPATIENT)
Dept: OTHER | Facility: HOSPITAL | Age: 74
End: 2024-02-12
Payer: COMMERCIAL

## 2024-02-12 VITALS
DIASTOLIC BLOOD PRESSURE: 74 MMHG | BODY MASS INDEX: 30.36 KG/M2 | TEMPERATURE: 96.8 F | HEART RATE: 74 BPM | WEIGHT: 165 LBS | SYSTOLIC BLOOD PRESSURE: 135 MMHG | RESPIRATION RATE: 18 BRPM | HEIGHT: 62 IN

## 2024-02-12 DIAGNOSIS — C82.09 GRADE I FOLLICULAR LYMPHOMA OF EXTRANODAL SITE EXCLUDING SPLEEN AND OTHER SOLID ORGANS: Primary | ICD-10-CM

## 2024-02-12 LAB
BASOPHILS # BLD AUTO: 0.03 10*3/MM3 (ref 0–0.2)
BASOPHILS NFR BLD AUTO: 0.7 % (ref 0–1.5)
DEPRECATED RDW RBC AUTO: 43.8 FL (ref 37–54)
EOSINOPHIL # BLD AUTO: 0.07 10*3/MM3 (ref 0–0.4)
EOSINOPHIL NFR BLD AUTO: 1.6 % (ref 0.3–6.2)
ERYTHROCYTE [DISTWIDTH] IN BLOOD BY AUTOMATED COUNT: 12.2 % (ref 12.3–15.4)
HCT VFR BLD AUTO: 43.7 % (ref 34–46.6)
HGB BLD-MCNC: 14.2 G/DL (ref 12–15.9)
IMM GRANULOCYTES # BLD AUTO: 0.01 10*3/MM3 (ref 0–0.05)
IMM GRANULOCYTES NFR BLD AUTO: 0.2 % (ref 0–0.5)
LYMPHOCYTES # BLD AUTO: 1.38 10*3/MM3 (ref 0.7–3.1)
LYMPHOCYTES NFR BLD AUTO: 30.9 % (ref 19.6–45.3)
MCH RBC QN AUTO: 31.6 PG (ref 26.6–33)
MCHC RBC AUTO-ENTMCNC: 32.5 G/DL (ref 31.5–35.7)
MCV RBC AUTO: 97.1 FL (ref 79–97)
MONOCYTES # BLD AUTO: 0.38 10*3/MM3 (ref 0.1–0.9)
MONOCYTES NFR BLD AUTO: 8.5 % (ref 5–12)
NEUTROPHILS NFR BLD AUTO: 2.6 10*3/MM3 (ref 1.7–7)
NEUTROPHILS NFR BLD AUTO: 58.1 % (ref 42.7–76)
PLATELET # BLD AUTO: 192 10*3/MM3 (ref 140–450)
PMV BLD AUTO: 10 FL (ref 6–12)
RBC # BLD AUTO: 4.5 10*6/MM3 (ref 3.77–5.28)
WBC NRBC COR # BLD AUTO: 4.47 10*3/MM3 (ref 3.4–10.8)

## 2024-02-12 PROCEDURE — 85025 COMPLETE CBC W/AUTO DIFF WBC: CPT | Performed by: INTERNAL MEDICINE

## 2024-02-12 PROCEDURE — 25810000003 SODIUM CHLORIDE 0.9 % SOLUTION: Performed by: INTERNAL MEDICINE

## 2024-02-12 PROCEDURE — 25010000002 DIPHENHYDRAMINE PER 50 MG: Performed by: INTERNAL MEDICINE

## 2024-02-12 PROCEDURE — 25010000002 RITUXIMAB 10 MG/ML SOLUTION 10 ML VIAL: Performed by: INTERNAL MEDICINE

## 2024-02-12 PROCEDURE — 25810000003 SODIUM CHLORIDE 0.9 % SOLUTION 250 ML FLEX CONT: Performed by: INTERNAL MEDICINE

## 2024-02-12 PROCEDURE — 96413 CHEMO IV INFUSION 1 HR: CPT

## 2024-02-12 PROCEDURE — 96415 CHEMO IV INFUSION ADDL HR: CPT

## 2024-02-12 PROCEDURE — 96375 TX/PRO/DX INJ NEW DRUG ADDON: CPT

## 2024-02-12 PROCEDURE — 25010000002 RITUXIMAB 10 MG/ML SOLUTION 50 ML VIAL: Performed by: INTERNAL MEDICINE

## 2024-02-12 RX ORDER — ACETAMINOPHEN 325 MG/1
650 TABLET ORAL ONCE
Status: COMPLETED | OUTPATIENT
Start: 2024-02-12 | End: 2024-02-12

## 2024-02-12 RX ORDER — SODIUM CHLORIDE 9 MG/ML
20 INJECTION, SOLUTION INTRAVENOUS ONCE
Status: COMPLETED | OUTPATIENT
Start: 2024-02-12 | End: 2024-02-12

## 2024-02-12 RX ADMIN — DIPHENHYDRAMINE HYDROCHLORIDE 25 MG: 50 INJECTION INTRAMUSCULAR; INTRAVENOUS at 08:30

## 2024-02-12 RX ADMIN — ACETAMINOPHEN 650 MG: 325 TABLET ORAL at 08:30

## 2024-02-12 RX ADMIN — RITUXIMAB 700 MG: 10 INJECTION, SOLUTION INTRAVENOUS at 09:02

## 2024-02-12 RX ADMIN — SODIUM CHLORIDE 20 ML/HR: 9 INJECTION, SOLUTION INTRAVENOUS at 08:30

## 2024-02-19 ENCOUNTER — HOSPITAL ENCOUNTER (OUTPATIENT)
Dept: ONCOLOGY | Facility: HOSPITAL | Age: 74
Discharge: HOME | End: 2024-02-19
Admitting: INTERNAL MEDICINE
Payer: COMMERCIAL

## 2024-02-19 ENCOUNTER — DOCUMENTATION (OUTPATIENT)
Dept: ONCOLOGY | Facility: CLINIC | Age: 74
End: 2024-02-19
Payer: COMMERCIAL

## 2024-02-19 ENCOUNTER — OFFICE VISIT (OUTPATIENT)
Dept: ONCOLOGY | Facility: CLINIC | Age: 74
End: 2024-02-19
Payer: COMMERCIAL

## 2024-02-19 VITALS — DIASTOLIC BLOOD PRESSURE: 68 MMHG | HEART RATE: 75 BPM | SYSTOLIC BLOOD PRESSURE: 122 MMHG

## 2024-02-19 VITALS
SYSTOLIC BLOOD PRESSURE: 151 MMHG | DIASTOLIC BLOOD PRESSURE: 72 MMHG | BODY MASS INDEX: 30.18 KG/M2 | HEIGHT: 62 IN | RESPIRATION RATE: 18 BRPM | TEMPERATURE: 98.1 F | OXYGEN SATURATION: 98 % | WEIGHT: 164 LBS | HEART RATE: 77 BPM

## 2024-02-19 DIAGNOSIS — C82.09 GRADE I FOLLICULAR LYMPHOMA OF EXTRANODAL SITE EXCLUDING SPLEEN AND OTHER SOLID ORGANS: Primary | ICD-10-CM

## 2024-02-19 LAB
BASOPHILS # BLD AUTO: 0.01 10*3/MM3 (ref 0–0.2)
BASOPHILS NFR BLD AUTO: 0.3 % (ref 0–1.5)
DEPRECATED RDW RBC AUTO: 43.1 FL (ref 37–54)
EOSINOPHIL # BLD AUTO: 0.1 10*3/MM3 (ref 0–0.4)
EOSINOPHIL NFR BLD AUTO: 2.6 % (ref 0.3–6.2)
ERYTHROCYTE [DISTWIDTH] IN BLOOD BY AUTOMATED COUNT: 12.1 % (ref 12.3–15.4)
HCT VFR BLD AUTO: 43.4 % (ref 34–46.6)
HGB BLD-MCNC: 14.3 G/DL (ref 12–15.9)
IMM GRANULOCYTES # BLD AUTO: 0 10*3/MM3 (ref 0–0.05)
IMM GRANULOCYTES NFR BLD AUTO: 0 % (ref 0–0.5)
LYMPHOCYTES # BLD AUTO: 1.17 10*3/MM3 (ref 0.7–3.1)
LYMPHOCYTES NFR BLD AUTO: 30.6 % (ref 19.6–45.3)
MCH RBC QN AUTO: 31.5 PG (ref 26.6–33)
MCHC RBC AUTO-ENTMCNC: 32.9 G/DL (ref 31.5–35.7)
MCV RBC AUTO: 95.6 FL (ref 79–97)
MONOCYTES # BLD AUTO: 0.36 10*3/MM3 (ref 0.1–0.9)
MONOCYTES NFR BLD AUTO: 9.4 % (ref 5–12)
NEUTROPHILS NFR BLD AUTO: 2.18 10*3/MM3 (ref 1.7–7)
NEUTROPHILS NFR BLD AUTO: 57.1 % (ref 42.7–76)
PLATELET # BLD AUTO: 170 10*3/MM3 (ref 140–450)
PMV BLD AUTO: 10.3 FL (ref 6–12)
RBC # BLD AUTO: 4.54 10*6/MM3 (ref 3.77–5.28)
WBC NRBC COR # BLD AUTO: 3.82 10*3/MM3 (ref 3.4–10.8)

## 2024-02-19 PROCEDURE — 25010000002 DIPHENHYDRAMINE PER 50 MG: Performed by: INTERNAL MEDICINE

## 2024-02-19 PROCEDURE — 25010000002 RITUXIMAB 10 MG/ML SOLUTION 10 ML VIAL: Performed by: INTERNAL MEDICINE

## 2024-02-19 PROCEDURE — 25010000002 RITUXIMAB 10 MG/ML SOLUTION 50 ML VIAL: Performed by: INTERNAL MEDICINE

## 2024-02-19 PROCEDURE — 96375 TX/PRO/DX INJ NEW DRUG ADDON: CPT

## 2024-02-19 PROCEDURE — 96413 CHEMO IV INFUSION 1 HR: CPT

## 2024-02-19 PROCEDURE — 96415 CHEMO IV INFUSION ADDL HR: CPT

## 2024-02-19 PROCEDURE — 99215 OFFICE O/P EST HI 40 MIN: CPT | Performed by: INTERNAL MEDICINE

## 2024-02-19 PROCEDURE — 25810000003 SODIUM CHLORIDE 0.9 % SOLUTION 250 ML FLEX CONT: Performed by: INTERNAL MEDICINE

## 2024-02-19 PROCEDURE — 25810000003 SODIUM CHLORIDE 0.9 % SOLUTION: Performed by: INTERNAL MEDICINE

## 2024-02-19 PROCEDURE — 85025 COMPLETE CBC W/AUTO DIFF WBC: CPT | Performed by: INTERNAL MEDICINE

## 2024-02-19 RX ORDER — ACETAMINOPHEN 325 MG/1
650 TABLET ORAL ONCE
Status: COMPLETED | OUTPATIENT
Start: 2024-02-19 | End: 2024-02-19

## 2024-02-19 RX ORDER — SODIUM CHLORIDE 9 MG/ML
20 INJECTION, SOLUTION INTRAVENOUS ONCE
Status: COMPLETED | OUTPATIENT
Start: 2024-02-19 | End: 2024-02-19

## 2024-02-19 RX ADMIN — RITUXIMAB 700 MG: 10 INJECTION, SOLUTION INTRAVENOUS at 11:27

## 2024-02-19 RX ADMIN — SODIUM CHLORIDE 20 ML/HR: 9 INJECTION, SOLUTION INTRAVENOUS at 10:50

## 2024-02-19 RX ADMIN — ACETAMINOPHEN 650 MG: 325 TABLET ORAL at 10:53

## 2024-02-19 RX ADMIN — DIPHENHYDRAMINE HYDROCHLORIDE 25 MG: 50 INJECTION INTRAMUSCULAR; INTRAVENOUS at 10:50

## 2024-02-19 NOTE — PROGRESS NOTES
CHIEF COMPLAINT: Multiple loose stools each day    Problem List:  Oncology/Hematology History Overview Note   1.  Follicular low-grade B-cell lymphoma terminal ileum with negative CT scan chest abdomen pelvis and PET.  No other obvious GI involvement on PillCam.  1/29/2024 through 2/19/2024 Rituxan weekly x 4  2.  Stage IIIa chronic kidney disease  3.  AVM without bleeding      Hematology oncology history timeline:  -9/23/2023 stool cultures negative for pathogens  -10/6/2023 EGD and colonoscopy Dr. Ortiz.  Antral biopsies showed minimal chronic gastritis H. pylori negative.  Transverse colon has a serrated sessile polyp negative for dysplasia.  Ileum biopsy showed atypical lymphoid infiltrate with some monotony.  Extra departmental review by [facility] consultant agreed with atypical lymphoid infiltrate that would support a low-grade follicular lymphoma but clinical correlation is essential per consultant.  Cells are positive for CD20 and less positive for CD3 and CD5.  Bcl-2 and CD10 strongly positive negative for cyclin D1.  -10/31/2023 CT chest abdomen pelvis with contrast with no worrisome findings for occult neoplasm.  Normal CBC and differential.  CMP unremarkable save for creatinine 1.17 GFR 49.7.  LDH mildly elevated 303 upper limit of normal 214.  -12/5/2023 colonoscopy 4 small ulcerationsin distal tumor ileum showed sigmoid polyp tubular adenoma. No colitis. Terminal ileum biopsy showed atypical lambda restricted clonal lymphoid proliferation consistent with low-grade lymphoproliferative disorder such as low-grade follicular lymphoma.  Addendum: Immunostain for H. pylori and Campylobacter is negative.    -1/4/2024 [facility] oncology hematology consult: Patient has felt vaguely poorly for the better part of the year.  Around September started having nausea and occasional vomiting and occasional diarrhea but mostly just progressive fatigue despite no anemia.  Endoscopy in October and  again in December showed ulcerations in the distal ileum without colitis but with lambda restricted clonal B-cell low-grade follicular lymphoma.  This is unlikely to be the immunoproliferative alpha heavy chain related MALT lymphoma but to be more likely the nonimmunoproliferative low-grade follicular lymphoma that may be associated with inflammatory bowel though the ileal biopsies do not confirm colitis nor did the CAT scan show any colitis.  PET scans are not generally extremely helpful with low-grade lymphomas but given that we do not have any other easily measurable disease I think it is reasonable to check a PET for that as well as to be sure there is no unexpectedly hot areas that may have discordant histology that may need more aggressive therapy if higher grade.  If the pet does light up vaguely, that would not distinguish inflammatory bowel from low-grade follicular B-cell lymphoma of the small bowel as they would both light up similarly.  I have communicated with Dr. Ortiz to get the ileum biopsy from December stain for H. pylori and Campylobacter jejuni and I will check her CBC, CMP, HIV, hep B, hep C, H. pylori breath test, and alpha heavy chains along with serum immunofixation electrophoresis.  At the end of the day, treatment will largely be guided by her symptoms which while nebulous are significant enough that I suspect we will end up giving her Rituxan in the next month or 2 pending results of these tests.  She understands the common dilemma with low-grade lymphomas of deciding when to treat as there is a very fine risk-benefit ratio balance.She has had frequent upper respiratory infections and I will check her quantitative immunoglobulins as well.    -1/4/2024 data:  CBC normal with white count 5580 hemoglobin 15.4 platelets 204,000 and normal differential.  Glucose 108 creatinine 1.02 GFR 58 chloride 110 alk phos 129 otherwise unremarkable CMP.  Negative H. pylori breath test.  Normal serum  immunoglobulin G/A/M kappa and lambda light chains with normal heavy to light chain ratio.  Normal quantitative immunoglobulins with no serum M spike.  Hepatitis C and B antibodies negative.  HIV 1 and 2 negative.   upper limit of normal 214.  Uric acid 5.9 upper limit of normal 5.7.  Sedimentation rate 19 normal.    -1/11/24 PET showed no hypermetabolism with normal-appearing bowel.    -1/18/2024 [facility] oncology telemedicine follow-up: I reviewed the above data with the patient.  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  She does have a capsule endoscopy for Tuesday of next week and I have left a message with Dr. 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  In order to know whether or not treatment is effective, she will need repeat endoscopy about 3 months out from treatment as the scans not surprisingly are entirely negative and I do not think we will have measurable disease to assess response either serologically or radiographically.  I have communicated this as well to Dr. Ortiz.  Presuming we proceed with his track I will see her the month out from her final weekly Rituxan. Odds of high-grade lymphoma would seem unlikely with minimally elevated LDH and  uric acid but I will ask my nurse  to give uric acid depleting allopurinol prescription.    - 1/25/2024 PillCam showed small nonbleeding angiodysplasias in the jejunum.  Ulcerated and edematous mucosa with luminal stenosis in the distal ileum.  No obvious mass or evidence of lymphoma.    -1/26/2024 chemotherapy preparation visit.    -1/29/2024 through 2/19/2024 Rituxan weekly x 4.  Normal weekly CBC through this.  Consultation with nutritionists    -2/19/2024 [facility] medical oncology follow-up: Today is her fourth of 4 weekly Rituxan treatments thus far with no complications.  CBC today is unremarkable.  Will get CMP but urinating well and no hint of fluid overload from renal dysfunction.  Capsule endoscopy only showed the lesion in the ileum which previously on biopsy showed the lymphoma.  No other disease on PET.  We will repeat CTs and endoscopy with Dr. Ortiz in 3 months.  Tolerated Rituxan without complication.  Still having diarrhea which predated the Rituxan.  She cannot work with this.     Grade I follicular lymphoma of extranodal site excluding spleen and other solid organs   1/4/2024 Initial Diagnosis    Grade I follicular lymphoma of extranodal site excluding spleen and other solid organs     1/4/2024 Cancer Staged    Staging form: Hodgkin And Non-Hodgkin Lymphoma, AJCC 8th Edition  - Clinical stage from 1/4/2024: Stage IV (Follicular lymphoma) - Signed by En Carballo MD on 1/4/2024 1/29/2024 -  Chemotherapy    OP LYMPHOMA (CLL) RiTUXimab (Weekly X 4)         HISTORY OF PRESENT ILLNESS:  The patient is a 73 y.o. female, here for follow up on management of low-grade follicular lymphoma of the bowel without visible disease on PET and otherwise negative capsule endoscopy save for the area in the ileum and now status post Rituxan weekly x 4.  Still having copious diarrhea which predated her treatment.  Unable to work with this.    History reviewed. No pertinent past medical history.  Past Surgical  "History:   Procedure Laterality Date    OTHER SURGICAL HISTORY  01/25/2024    Pill Cam       No Known Allergies    Family History and Social History reviewed and changed as necessary    REVIEW OF SYSTEM:   Fatigue from the diarrhea    PHYSICAL EXAM:  No jaundice icterus pallor or respiratory distress.  No rashes.    Vitals:    02/19/24 0929   BP: 151/72   Pulse: 77   Resp: 18   Temp: 98.1 °F (36.7 °C)   SpO2: 98%   Weight: 74.4 kg (164 lb)   Height: 157.5 cm (62\")     Vitals:    02/19/24 0929   PainSc: 0-No pain          ECOG score: 1           Vitals reviewed.    ECOG: (1) Restricted in Physically Strenuous Activity, Ambulatory & Able to Do Work of Light Nature    Lab Results   Component Value Date    HGB 14.3 02/19/2024    HCT 43.4 02/19/2024    MCV 95.6 02/19/2024     02/19/2024    WBC 3.82 02/19/2024    NEUTROABS 2.18 02/19/2024    LYMPHSABS 1.17 02/19/2024    MONOSABS 0.36 02/19/2024    EOSABS 0.10 02/19/2024    BASOSABS 0.01 02/19/2024       Lab Results   Component Value Date    GLUCOSE 103 (H) 01/26/2024    BUN 13 01/26/2024    CREATININE 0.79 01/26/2024     01/26/2024    K 4.1 01/26/2024     01/26/2024    CO2 24.0 01/26/2024    CALCIUM 9.2 01/26/2024    PROTEINTOT 6.8 01/26/2024    ALBUMIN 3.8 01/26/2024    BILITOT 0.8 01/26/2024    ALKPHOS 106 01/26/2024    AST 23 01/26/2024    ALT 17 01/26/2024             ASSESSMENT & PLAN:  1.  Follicular low-grade B-cell lymphoma terminal ileum with negative CT scan chest abdomen pelvis and PET.  No other obvious GI involvement on PillCam.  1/29/2024 through 2/19/2024 Rituxan weekly x 4  2.  Stage IIIa chronic kidney disease  3.  AVM without bleeding      Hematology oncology history timeline:  -9/23/2023 stool cultures negative for pathogens  -10/6/2023 EGD and colonoscopy Dr. Ortiz.  Antral biopsies showed minimal chronic gastritis H. pylori negative.  Transverse colon has a serrated sessile polyp negative for dysplasia.  Ileum biopsy showed " atypical lymphoid infiltrate with some monotony.  Extra departmental review by [facility] consultant agreed with atypical lymphoid infiltrate that would support a low-grade follicular lymphoma but clinical correlation is essential per consultant.  Cells are positive for CD20 and less positive for CD3 and CD5.  Bcl-2 and CD10 strongly positive negative for cyclin D1.  -10/31/2023 CT chest abdomen pelvis with contrast with no worrisome findings for occult neoplasm.  Normal CBC and differential.  CMP unremarkable save for creatinine 1.17 GFR 49.7.  LDH mildly elevated 303 upper limit of normal 214.  -12/5/2023 colonoscopy 4 small ulcerationsin distal tumor ileum showed sigmoid polyp tubular adenoma. No colitis. Terminal ileum biopsy showed atypical lambda restricted clonal lymphoid proliferation consistent with low-grade lymphoproliferative disorder such as low-grade follicular lymphoma.  Addendum: Immunostain for H. pylori and Campylobacter is negative.    -1/4/2024 [facility] medical oncology hematology consult: Patient has felt vaguely poorly for the better part of the year.  Around September started having nausea and occasional vomiting and occasional diarrhea but mostly just progressive fatigue despite no anemia.  Endoscopy in October and again in December showed ulcerations in the distal ileum without colitis but with lambda restricted clonal B-cell low-grade follicular lymphoma.  This is unlikely to be the immunoproliferative alpha heavy chain related MALT lymphoma but to be more likely the nonimmunoproliferative low-grade follicular lymphoma that may be associated with inflammatory bowel though the ileal biopsies do not confirm colitis nor did the CAT scan show any colitis.  PET scans are not generally extremely helpful with low-grade lymphomas but given that we do not have any other easily measurable disease I think it is reasonable to check a PET for that as well as to be sure there is no  unexpectedly hot areas that may have discordant histology that may need more aggressive therapy if higher grade.  If the pet does light up vaguely, that would not distinguish inflammatory bowel from low-grade follicular B-cell lymphoma of the small bowel as they would both light up similarly.  I have communicated with Dr. Ortiz to get the ileum biopsy from December stain for H. pylori and Campylobacter jejuni and I will check her CBC, CMP, HIV, hep B, hep C, H. pylori breath test, and alpha heavy chains along with serum immunofixation electrophoresis.  At the end of the day, treatment will largely be guided by her symptoms which while nebulous are significant enough that I suspect we will end up giving her Rituxan in the next month or 2 pending results of these tests.  She understands the common dilemma with low-grade lymphomas of deciding when to treat as there is a very fine risk-benefit ratio balance.She has had frequent upper respiratory infections and I will check her quantitative immunoglobulins as well.    -1/4/2024 data:  CBC normal with white count 5580 hemoglobin 15.4 platelets 204,000 and normal differential.  Glucose 108 creatinine 1.02 GFR 58 chloride 110 alk phos 129 otherwise unremarkable CMP.  Negative H. pylori breath test.  Normal serum immunoglobulin G/A/M kappa and lambda light chains with normal heavy to light chain ratio.  Normal quantitative immunoglobulins with no serum M spike.  Hepatitis C and B antibodies negative.  HIV 1 and 2 negative.   upper limit of normal 214.  Uric acid 5.9 upper limit of normal 5.7.  Sedimentation rate 19 normal.    -1/11/24 PET showed no hypermetabolism with normal-appearing bowel.    -1/18/2024 [facility] oncology telemedicine follow-up: I reviewed the above data with the patient.  Were it not for the biopsy and her general overall poor quality of life, the objective findings from her follicular low-grade lymphoma of the small bowel would not push us  towards treatment but given how she feels I think it is reasonable to consider Rituxan weekly x 4 as stated above.  She does have a capsule endoscopy for Tuesday of next week and I have left a message with Dr. 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  In order to know whether or not treatment is effective, she will need repeat endoscopy about 3 months out from treatment as the scans not surprisingly are entirely negative and I do not think we will have measurable disease to assess response either serologically or radiographically.  I have communicated this as well to Dr. Ortiz.  Presuming we proceed with his track I will see her the month out from her final weekly Rituxan. Odds of high-grade lymphoma would seem unlikely with minimally elevated LDH and uric acid but I will ask my nurse  to give uric acid depleting allopurinol prescription.    - 1/25/2024 PillCam showed small nonbleeding angiodysplasias in the jejunum.  Ulcerated and edematous mucosa with luminal stenosis in the distal ileum.  No obvious mass or evidence of lymphoma.    -1/26/2024 chemotherapy preparation visit.    -1/29/2024 through 2/19/2024 Rituxan weekly x 4.  Normal weekly CBC through this.  Consultation with nutritionists    -2/19/2024 [facility] medical oncology follow-up: Today is her fourth of 4 weekly Rituxan treatments thus far with no complications.  CBC today is unremarkable.  Will get CMP but urinating well and no hint of fluid overload  from renal dysfunction.  Capsule endoscopy only showed the lesion in the ileum which previously on biopsy showed the lymphoma.  No other disease on PET.  We will repeat CTs and endoscopy with Dr. Ortiz in 3 months.  Tolerated Rituxan without complication.  Still having diarrhea which predated the Rituxan.  She cannot work with this.    Total time of care today inclusive of time spent today prior to patient's arrival reviewing interval data and PillCam and during visit translating that information to the patient and interviewing her as to signs or symptoms of her disease and management thereof and after visit instituting the plan as outlined above took 50 minutes of patient care time throughout the day today.  En Carballo MD    02/19/2024

## 2024-02-19 NOTE — LETTER
February 19, 2024       No Recipients    Patient: Cata Hidalgo   YOB: 1950   Date of Visit: 2/19/2024     Dear Santiago Collado, DO:       Thank you for referring Cata Hidalgo to me for evaluation. Below are the relevant portions of my assessment and plan of care.    If you have questions, please do not hesitate to call me. I look forward to following Cata along with you.         Sincerely,        En Carballo MD        CC:   No Recipients    En Carballo MD  02/19/24 1012  Sign when Signing Visit  CHIEF COMPLAINT: Multiple loose stools each day    Problem List:  Oncology/Hematology History Overview Note   1.  Follicular low-grade B-cell lymphoma terminal ileum with negative CT scan chest abdomen pelvis and PET.  No other obvious GI involvement on PillCam.  1/29/2024 through 2/19/2024 Rituxan weekly x 4  2.  Stage IIIa chronic kidney disease  3.  AVM without bleeding      Hematology oncology history timeline:  -9/23/2023 stool cultures negative for pathogens  -10/6/2023 EGD and colonoscopy Dr. Ortiz.  Antral biopsies showed minimal chronic gastritis H. pylori negative.  Transverse colon has a serrated sessile polyp negative for dysplasia.  Ileum biopsy showed atypical lymphoid infiltrate with some monotony.  Extra departmental review by [facility] [place] [facility] consultant agreed with atypical lymphoid infiltrate that would support a low-grade follicular lymphoma but clinical correlation is essential per consultant.  Cells are positive for CD20 and less positive for CD3 and CD5.  Bcl-2 and CD10 strongly positive negative for cyclin D1.  -10/31/2023 CT chest abdomen pelvis with contrast with no worrisome findings for occult neoplasm.  Normal CBC and differential.  CMP unremarkable save for creatinine 1.17 GFR 49.7.  LDH mildly elevated 303 upper limit of normal 214.  -12/5/2023 colonoscopy 4 small ulcerationsin distal tumor ileum showed sigmoid polyp tubular adenoma. No colitis.  Terminal ileum biopsy showed atypical lambda restricted clonal lymphoid proliferation consistent with low-grade lymphoproliferative disorder such as low-grade follicular lymphoma.  Addendum: Immunostain for H. pylori and Campylobacter is negative.    -1/4/2024 [facility] oncology hematology consult: Patient has felt vaguely poorly for the better part of the year.  Around September started having nausea and occasional vomiting and occasional diarrhea but mostly just progressive fatigue despite no anemia.  Endoscopy in October and again in December showed ulcerations in the distal ileum without colitis but with lambda restricted clonal B-cell low-grade follicular lymphoma.  This is unlikely to be the immunoproliferative alpha heavy chain related MALT lymphoma but to be more likely the nonimmunoproliferative low-grade follicular lymphoma that may be associated with inflammatory bowel though the ileal biopsies do not confirm colitis nor did the CAT scan show any colitis.  PET scans are not generally extremely helpful with low-grade lymphomas but given that we do not have any other easily measurable disease I think it is reasonable to check a PET for that as well as to be sure there is no unexpectedly hot areas that may have discordant histology that may need more aggressive therapy if higher grade.  If the pet does light up vaguely, that would not distinguish inflammatory bowel from low-grade follicular B-cell lymphoma of the small bowel as they would both light up similarly.  I have communicated with Dr. Ortiz to get the ileum biopsy from December stain for H. pylori and Campylobacter jejuni and I will check her CBC, CMP, HIV, hep B, hep C, H. pylori breath test, and alpha heavy chains along with serum immunofixation electrophoresis.  At the end of the day, treatment will largely be guided by her symptoms which while nebulous are significant enough that I suspect we will end up giving her Rituxan in the next  month or 2 pending results of these tests.  She understands the common dilemma with low-grade lymphomas of deciding when to treat as there is a very fine risk-benefit ratio balance.She has had frequent upper respiratory infections and I will check her quantitative immunoglobulins as well.    -1/4/2024 data:  CBC normal with white count 5580 hemoglobin 15.4 platelets 204,000 and normal differential.  Glucose 108 creatinine 1.02 GFR 58 chloride 110 alk phos 129 otherwise unremarkable CMP.  Negative H. pylori breath test.  Normal serum immunoglobulin G/A/M kappa and lambda light chains with normal heavy to light chain ratio.  Normal quantitative immunoglobulins with no serum M spike.  Hepatitis C and B antibodies negative.  HIV 1 and 2 negative.   upper limit of normal 214.  Uric acid 5.9 upper limit of normal 5.7.  Sedimentation rate 19 normal.    -1/11/24 PET showed no hypermetabolism with normal-appearing bowel.    -1/18/2024 [facility] oncology telemedicine follow-up: I reviewed the above data with the patient.  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  She does have a capsule endoscopy for Tuesday of next week and I have left a message with Dr. 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  In order to know whether or not treatment is effective, she will need repeat endoscopy about 3 months out from treatment as the scans not surprisingly are entirely negative and I do not think we will have measurable disease to assess response either serologically or radiographically.  I have communicated this as well to Dr. Ortiz.  Presuming we proceed with his track I will see her the month out from her final weekly Rituxan. Odds of high-grade lymphoma would seem unlikely with minimally elevated LDH and uric acid but I will ask my nurse  to give uric acid depleting allopurinol prescription.    - 1/25/2024 PillCam showed small nonbleeding angiodysplasias in the jejunum.  Ulcerated and edematous mucosa with luminal stenosis in the distal ileum.  No obvious mass or evidence of lymphoma.    -1/26/2024 chemotherapy preparation visit.    -1/29/2024 through 2/19/2024 Rituxan weekly x 4.  Normal weekly CBC through this.  Consultation with nutritionists    -2/19/2024 [facility] medical oncology follow-up: Today is her fourth of 4 weekly Rituxan treatments thus far with no complications.  CBC today is unremarkable.  Will get CMP but urinating well and no hint of fluid overload from renal dysfunction.  Capsule endoscopy only showed the lesion in the ileum which previously on biopsy showed the lymphoma.  No other disease on PET.  We will repeat CTs and endoscopy with Dr. Ortiz in 3 months.  Tolerated Rituxan without complication.  Still having diarrhea which predated the Rituxan.  She cannot work with this.     Grade I follicular lymphoma of extranodal site excluding spleen and other solid organs   1/4/2024 Initial Diagnosis    Grade I follicular lymphoma of extranodal site excluding spleen and other solid organs     1/4/2024 Cancer Staged    Staging form: Hodgkin And Non-Hodgkin Lymphoma, AJCC 8th Edition  - Clinical stage from 1/4/2024: Stage IV (Follicular lymphoma) - Signed by En Carballo MD on  "1/4/2024 1/29/2024 -  Chemotherapy    OP LYMPHOMA (CLL) RiTUXimab (Weekly X 4)         HISTORY OF PRESENT ILLNESS:  The patient is a 73 y.o. female, here for follow up on management of low-grade follicular lymphoma of the bowel without visible disease on PET and otherwise negative capsule endoscopy save for the area in the ileum and now status post Rituxan weekly x 4.  Still having copious diarrhea which predated her treatment.  Unable to work with this.    History reviewed. No pertinent past medical history.  Past Surgical History:   Procedure Laterality Date   • OTHER SURGICAL HISTORY  01/25/2024    Pill Cam       No Known Allergies    Family History and Social History reviewed and changed as necessary    REVIEW OF SYSTEM:   Fatigue from the diarrhea    PHYSICAL EXAM:  No jaundice icterus pallor or respiratory distress.  No rashes.    Vitals:    02/19/24 0929   BP: 151/72   Pulse: 77   Resp: 18   Temp: 98.1 °F (36.7 °C)   SpO2: 98%   Weight: 74.4 kg (164 lb)   Height: 157.5 cm (62\")     Vitals:    02/19/24 0929   PainSc: 0-No pain          ECOG score: 1           Vitals reviewed.    ECOG: (1) Restricted in Physically Strenuous Activity, Ambulatory & Able to Do Work of Light Nature    Lab Results   Component Value Date    HGB 14.3 02/19/2024    HCT 43.4 02/19/2024    MCV 95.6 02/19/2024     02/19/2024    WBC 3.82 02/19/2024    NEUTROABS 2.18 02/19/2024    LYMPHSABS 1.17 02/19/2024    MONOSABS 0.36 02/19/2024    EOSABS 0.10 02/19/2024    BASOSABS 0.01 02/19/2024       Lab Results   Component Value Date    GLUCOSE 103 (H) 01/26/2024    BUN 13 01/26/2024    CREATININE 0.79 01/26/2024     01/26/2024    K 4.1 01/26/2024     01/26/2024    CO2 24.0 01/26/2024    CALCIUM 9.2 01/26/2024    PROTEINTOT 6.8 01/26/2024    ALBUMIN 3.8 01/26/2024    BILITOT 0.8 01/26/2024    ALKPHOS 106 01/26/2024    AST 23 01/26/2024    ALT 17 01/26/2024             ASSESSMENT & PLAN:  1.  Follicular low-grade B-cell " lymphoma terminal ileum with negative CT scan chest abdomen pelvis and PET.  No other obvious GI involvement on PillCam.  1/29/2024 through 2/19/2024 Rituxan weekly x 4  2.  Stage IIIa chronic kidney disease  3.  AVM without bleeding      Hematology oncology history timeline:  -9/23/2023 stool cultures negative for pathogens  -10/6/2023 EGD and colonoscopy Dr. Ortiz.  Antral biopsies showed minimal chronic gastritis H. pylori negative.  Transverse colon has a serrated sessile polyp negative for dysplasia.  Ileum biopsy showed atypical lymphoid infiltrate with some monotony.  Extra departmental review by [facility] [place] [facility] consultant agreed with atypical lymphoid infiltrate that would support a low-grade follicular lymphoma but clinical correlation is essential per consultant.  Cells are positive for CD20 and less positive for CD3 and CD5.  Bcl-2 and CD10 strongly positive negative for cyclin D1.  -10/31/2023 CT chest abdomen pelvis with contrast with no worrisome findings for occult neoplasm.  Normal CBC and differential.  CMP unremarkable save for creatinine 1.17 GFR 49.7.  LDH mildly elevated 303 upper limit of normal 214.  -12/5/2023 colonoscopy 4 small ulcerationsin distal tumor ileum showed sigmoid polyp tubular adenoma. No colitis. Terminal ileum biopsy showed atypical lambda restricted clonal lymphoid proliferation consistent with low-grade lymphoproliferative disorder such as low-grade follicular lymphoma.  Addendum: Immunostain for H. pylori and Campylobacter is negative.    -1/4/2024 [facility] medical oncology hematology consult: Patient has felt vaguely poorly for the better part of the year.  Around September started having nausea and occasional vomiting and occasional diarrhea but mostly just progressive fatigue despite no anemia.  Endoscopy in October and again in December showed ulcerations in the distal ileum without colitis but with lambda restricted clonal B-cell low-grade follicular  lymphoma.  This is unlikely to be the immunoproliferative alpha heavy chain related MALT lymphoma but to be more likely the nonimmunoproliferative low-grade follicular lymphoma that may be associated with inflammatory bowel though the ileal biopsies do not confirm colitis nor did the CAT scan show any colitis.  PET scans are not generally extremely helpful with low-grade lymphomas but given that we do not have any other easily measurable disease I think it is reasonable to check a PET for that as well as to be sure there is no unexpectedly hot areas that may have discordant histology that may need more aggressive therapy if higher grade.  If the pet does light up vaguely, that would not distinguish inflammatory bowel from low-grade follicular B-cell lymphoma of the small bowel as they would both light up similarly.  I have communicated with Dr. Ortiz to get the ileum biopsy from December stain for H. pylori and Campylobacter jejuni and I will check her CBC, CMP, HIV, hep B, hep C, H. pylori breath test, and alpha heavy chains along with serum immunofixation electrophoresis.  At the end of the day, treatment will largely be guided by her symptoms which while nebulous are significant enough that I suspect we will end up giving her Rituxan in the next month or 2 pending results of these tests.  She understands the common dilemma with low-grade lymphomas of deciding when to treat as there is a very fine risk-benefit ratio balance.She has had frequent upper respiratory infections and I will check her quantitative immunoglobulins as well.    -1/4/2024 data:  CBC normal with white count 5580 hemoglobin 15.4 platelets 204,000 and normal differential.  Glucose 108 creatinine 1.02 GFR 58 chloride 110 alk phos 129 otherwise unremarkable CMP.  Negative H. pylori breath test.  Normal serum immunoglobulin G/A/M kappa and lambda light chains with normal heavy to light chain ratio.  Normal quantitative immunoglobulins with no  serum M spike.  Hepatitis C and B antibodies negative.  HIV 1 and 2 negative.   upper limit of normal 214.  Uric acid 5.9 upper limit of normal 5.7.  Sedimentation rate 19 normal.    -1/11/24 PET showed no hypermetabolism with normal-appearing bowel.    -1/18/2024 [facility] oncology telemedicine follow-up: I reviewed the above data with the patient.  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  She does have a capsule endoscopy for Tuesday of next week and I have left a message with Dr. 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  In order to know whether or not treatment is effective, she will need repeat endoscopy about 3 months out from treatment as the scans not surprisingly are entirely negative and I do not think we will have measurable disease to assess response either serologically or radiographically.  I have communicated this as well to Dr. Ortiz.  Presuming we proceed with his track I will see her the month out from her final weekly Rituxan. Odds of high-grade lymphoma would seem unlikely with minimally elevated LDH and uric acid but I will ask my nurse  to give uric acid depleting allopurinol prescription.    - 1/25/2024 PillCam showed small nonbleeding  angiodysplasias in the jejunum.  Ulcerated and edematous mucosa with luminal stenosis in the distal ileum.  No obvious mass or evidence of lymphoma.    -1/26/2024 chemotherapy preparation visit.    -1/29/2024 through 2/19/2024 Rituxan weekly x 4.  Normal weekly CBC through this.  Consultation with nutritionists    -2/19/2024 [facility] oncology follow-up: Today is her fourth of 4 weekly Rituxan treatments thus far with no complications.  CBC today is unremarkable.  Will get CMP but urinating well and no hint of fluid overload from renal dysfunction.  Capsule endoscopy only showed the lesion in the ileum which previously on biopsy showed the lymphoma.  No other disease on PET.  We will repeat CTs and endoscopy with Dr. Ortiz in 3 months.  Tolerated Rituxan without complication.  Still having diarrhea which predated the Rituxan.  She cannot work with this.    Total time of care today inclusive of time spent today prior to patient's arrival reviewing interval data and PillCam and during visit translating that information to the patient and interviewing her as to signs or symptoms of her disease and management thereof and after visit instituting the plan as outlined above took 50 minutes of patient care time throughout the day today.  En Carballo MD    02/19/2024

## 2024-04-16 NOTE — LETTER
January 18, 2024       No Recipients    Patient: Cata Hidalgo   YOB: 1950   Date of Visit: 1/18/2024       Dear Santiago Collado, DO    Cata Hidalgo was in my office today. Below is a copy of my note.    If you have questions, please do not hesitate to call me. I look forward to following Cata along with you.         Sincerely,        En Carballo MD        CC:   No Recipients    Telehealth follow-up visit  Done for inclement weather as well as COVID-19 risk reduction.  Verbal consent given.  CHIEF COMPLAINT: Abdominal discomforts and severe fatigue with low-grade bowel lymphoma    Problem List:  Oncology/Hematology History Overview Note   1.  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  Stage IIIa chronic kidney disease      Hematology oncology history timeline:  -9/23/2023 stool cultures negative for pathogens  -10/6/2023 EGD and colonoscopy Dr. Ortiz.  Antral biopsies showed minimal chronic gastritis H. pylori negative.  Transverse colon has a serrated sessile polyp negative for dysplasia.  Ileum biopsy showed atypical lymphoid infiltrate with some monotony.  Extra departmental review by [facility] [place] [facility] consultant agreed with atypical lymphoid infiltrate that would support a low-grade follicular lymphoma but clinical correlation is essential per consultant.  Cells are positive for CD20 and less positive for CD3 and CD5.  Bcl-2 and CD10 strongly positive negative for cyclin D1.  -10/31/2023 CT chest abdomen pelvis with contrast with no worrisome findings for occult neoplasm.  Normal CBC and differential.  CMP unremarkable save for creatinine 1.17 GFR 49.7.  LDH mildly elevated 303 upper limit of normal 214.  -12/5/2023 colonoscopy 4 small ulcerationsin distal tumor ileum showed sigmoid polyp tubular adenoma. No colitis. Terminal ileum biopsy showed atypical lambda restricted clonal lymphoid proliferation consistent with  low-grade lymphoproliferative disorder such as low-grade follicular lymphoma.  Addendum: Immunostain for H. pylori and Campylobacter is negative.    -1/4/2024 [facility] oncology hematology consult: Patient has felt vaguely poorly for the better part of the year.  Around September started having nausea and occasional vomiting and occasional diarrhea but mostly just progressive fatigue despite no anemia.  Endoscopy in October and again in December showed ulcerations in the distal ileum without colitis but with lambda restricted clonal B-cell low-grade follicular lymphoma.  This is unlikely to be the immunoproliferative alpha heavy chain related MALT lymphoma but to be more likely the nonimmunoproliferative low-grade follicular lymphoma that may be associated with inflammatory bowel though the ileal biopsies do not confirm colitis nor did the CAT scan show any colitis.  PET scans are not generally extremely helpful with low-grade lymphomas but given that we do not have any other easily measurable disease I think it is reasonable to check a PET for that as well as to be sure there is no unexpectedly hot areas that may have discordant histology that may need more aggressive therapy if higher grade.  If the pet does light up vaguely, that would not distinguish inflammatory bowel from low-grade follicular B-cell lymphoma of the small bowel as they would both light up similarly.  I have communicated with Dr. Ortiz to get the ileum biopsy from December stain for H. pylori and Campylobacter jejuni and I will check her CBC, CMP, HIV, hep B, hep C, H. pylori breath test, and alpha heavy chains along with serum immunofixation electrophoresis.  At the end of the day, treatment will largely be guided by her symptoms which while nebulous are significant enough that I suspect we will end up giving her Rituxan in the next month or 2 pending results of these tests.  She understands the common dilemma with low-grade lymphomas of  deciding when to treat as there is a very fine risk-benefit ratio balance.She has had frequent upper respiratory infections and I will check her quantitative immunoglobulins as well.    -1/4/2024 data:  CBC normal with white count 5580 hemoglobin 15.4 platelets 204,000 and normal differential.  Glucose 108 creatinine 1.02 GFR 58 chloride 110 alk phos 129 otherwise unremarkable CMP.  Negative H. pylori breath test.  Normal serum immunoglobulin G/A/M kappa and lambda light chains with normal heavy to light chain ratio.  Normal quantitative immunoglobulins with no serum M spike.  Hepatitis C and B antibodies negative.  HIV 1 and 2 negative.   upper limit of normal 214.  Uric acid 5.9 upper limit of normal 5.7.  Sedimentation rate 19 normal.    -1/11/24 PET showed no hypermetabolism with normal-appearing bowel.    -1/18/2024 [facility] medical oncology follow-up: I reviewed the above data with the patient.  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  She does have a capsule endoscopy for Tuesday of next week and I have left a message with Dr. 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  In order to know whether or not treatment is effective, she will need repeat endoscopy about  "3 months out from treatment as the scans not surprisingly are entirely negative and I do not think we will have measurable disease to assess response either serologically or radiographically.  I have communicated this as well to Dr. Ortiz.  Presuming we proceed with his track I will see her the month out from her final weekly Rituxan.     Grade I follicular lymphoma of extranodal site excluding spleen and other solid organs   1/4/2024 Initial Diagnosis    Grade I follicular lymphoma of extranodal site excluding spleen and other solid organs     1/4/2024 Cancer Staged    Staging form: Hodgkin And Non-Hodgkin Lymphoma, AJCC 8th Edition  - Clinical stage from 1/4/2024: Stage IV (Follicular lymphoma) - Signed by En Carballo MD on 1/4/2024 1/29/2024 -  Chemotherapy    OP LYMPHOMA (CLL) RiTUXimab (Weekly X 4)         HISTORY OF PRESENT ILLNESS:  The patient is a 73 y.o. female, here for follow up on management of follicular low-grade lymphoma of the bowel feeling generally quite fatigued and poorly for the better part of the year    History reviewed. No pertinent past medical history.  History reviewed. No pertinent surgical history.    No Known Allergies    Family History and Social History reviewed and changed as necessary    REVIEW OF SYSTEM:   Some mild abdominal pains    PHYSICAL EXAM:  No jaundice icterus or pallor.  No visible adenopathy.  No respiratory distress.    Vitals:    01/18/24 0921   Height: 157.5 cm (62\")       ECOG: (0) Fully Active - Able to Carry On All Pre-disease Performance Without Restriction    Lab Results   Component Value Date    HGB 15.4 01/04/2024    HCT 46.3 01/04/2024    MCV 95.9 01/04/2024     01/04/2024    WBC 5.58 01/04/2024    NEUTROABS 3.40 01/04/2024    LYMPHSABS 1.62 01/04/2024    MONOSABS 0.43 01/04/2024    EOSABS 0.09 01/04/2024    BASOSABS 0.02 01/04/2024       Lab Results   Component Value Date    GLUCOSE 108 (H) 01/04/2024    BUN 11 01/04/2024    CREATININE 1.02 " (H) 01/04/2024     01/04/2024    K 3.9 01/04/2024     (H) 01/04/2024    CO2 26.0 01/04/2024    CALCIUM 9.5 01/04/2024    PROTEINTOT 6.8 01/04/2024    ALBUMIN 4.3 01/04/2024    ALBUMIN 3.6 01/04/2024    BILITOT 1.1 01/04/2024    ALKPHOS 129 (H) 01/04/2024    AST 24 01/04/2024    ALT 16 01/04/2024             ASSESSMENT & PLAN:  1.  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  Stage IIIa chronic kidney disease      Hematology oncology history timeline:  -9/23/2023 stool cultures negative for pathogens  -10/6/2023 EGD and colonoscopy Dr. Ortiz.  Antral biopsies showed minimal chronic gastritis H. pylori negative.  Transverse colon has a serrated sessile polyp negative for dysplasia.  Ileum biopsy showed atypical lymphoid infiltrate with some monotony.  Extra departmental review by [facility] [place] [facility] consultant agreed with atypical lymphoid infiltrate that would support a low-grade follicular lymphoma but clinical correlation is essential per consultant.  Cells are positive for CD20 and less positive for CD3 and CD5.  Bcl-2 and CD10 strongly positive negative for cyclin D1.  -10/31/2023 CT chest abdomen pelvis with contrast with no worrisome findings for occult neoplasm.  Normal CBC and differential.  CMP unremarkable save for creatinine 1.17 GFR 49.7.  LDH mildly elevated 303 upper limit of normal 214.  -12/5/2023 colonoscopy 4 small ulcerationsin distal tumor ileum showed sigmoid polyp tubular adenoma. No colitis. Terminal ileum biopsy showed atypical lambda restricted clonal lymphoid proliferation consistent with low-grade lymphoproliferative disorder such as low-grade follicular lymphoma.  Addendum: Immunostain for H. pylori and Campylobacter is negative.    -1/4/2024 [facility] medical oncology hematology consult: Patient has felt vaguely poorly for the better part of the year.  Around September started having nausea and occasional vomiting and  occasional diarrhea but mostly just progressive fatigue despite no anemia.  Endoscopy in October and again in December showed ulcerations in the distal ileum without colitis but with lambda restricted clonal B-cell low-grade follicular lymphoma.  This is unlikely to be the immunoproliferative alpha heavy chain related MALT lymphoma but to be more likely the nonimmunoproliferative low-grade follicular lymphoma that may be associated with inflammatory bowel though the ileal biopsies do not confirm colitis nor did the CAT scan show any colitis.  PET scans are not generally extremely helpful with low-grade lymphomas but given that we do not have any other easily measurable disease I think it is reasonable to check a PET for that as well as to be sure there is no unexpectedly hot areas that may have discordant histology that may need more aggressive therapy if higher grade.  If the pet does light up vaguely, that would not distinguish inflammatory bowel from low-grade follicular B-cell lymphoma of the small bowel as they would both light up similarly.  I have communicated with Dr. Ortiz to get the ileum biopsy from December stain for H. pylori and Campylobacter jejuni and I will check her CBC, CMP, HIV, hep B, hep C, H. pylori breath test, and alpha heavy chains along with serum immunofixation electrophoresis.  At the end of the day, treatment will largely be guided by her symptoms which while nebulous are significant enough that I suspect we will end up giving her Rituxan in the next month or 2 pending results of these tests.  She understands the common dilemma with low-grade lymphomas of deciding when to treat as there is a very fine risk-benefit ratio balance.She has had frequent upper respiratory infections and I will check her quantitative immunoglobulins as well.    -1/4/2024 data:  CBC normal with white count 5580 hemoglobin 15.4 platelets 204,000 and normal differential.  Glucose 108 creatinine 1.02 GFR 58  chloride 110 alk phos 129 otherwise unremarkable CMP.  Negative H. pylori breath test.  Normal serum immunoglobulin G/A/M kappa and lambda light chains with normal heavy to light chain ratio.  Normal quantitative immunoglobulins with no serum M spike.  Hepatitis C and B antibodies negative.  HIV 1 and 2 negative.   upper limit of normal 214.  Uric acid 5.9 upper limit of normal 5.7.  Sedimentation rate 19 normal.    -1/11/24 PET showed no hypermetabolism with normal-appearing bowel.    -1/18/2024 [facility] oncology telemedicine follow-up: I reviewed the above data with the patient.  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  She does have a capsule endoscopy for Tuesday of next week and I have left a message with Dr. 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  In order to know whether or not treatment is effective, she will need repeat endoscopy about 3 months out from treatment as the scans not surprisingly are entirely negative and I do not think we will have measurable disease to assess response either serologically or radiographically.  I have communicated this as well to Dr. Ortiz.  Presuming we proceed with his track I will see her the month out from her  final weekly Rituxan.  Odds of high-grade lymphoma would seem unlikely with minimally elevated LDH and uric acid but I will ask my nurse  to give uric acid depleting allopurinol prescription.    Total time of care today inclusive of time spent today prior to patient's arrival reviewing interval images and reports thereof and interval data as outlined above and during visit interviewing patient as to signs and symptoms of her disease and translating the information to her from above and going over the risk-benefit ratios and educating her and setting up this plan took 1 hour patient care time throughout the day today.      En Carballo MD    01/18/2024       Hypothyroidism

## 2024-05-20 ENCOUNTER — OFFICE VISIT (OUTPATIENT)
Dept: ONCOLOGY | Facility: CLINIC | Age: 74
End: 2024-05-20
Payer: COMMERCIAL

## 2024-05-20 ENCOUNTER — HOSPITAL ENCOUNTER (OUTPATIENT)
Dept: CT IMAGING | Facility: HOSPITAL | Age: 74
Discharge: HOME | End: 2024-05-20
Payer: COMMERCIAL

## 2024-05-20 ENCOUNTER — LAB (OUTPATIENT)
Dept: LAB | Facility: HOSPITAL | Age: 74
End: 2024-05-20
Payer: COMMERCIAL

## 2024-05-20 VITALS
HEART RATE: 79 BPM | RESPIRATION RATE: 16 BRPM | HEIGHT: 62 IN | TEMPERATURE: 98.1 F | OXYGEN SATURATION: 98 % | DIASTOLIC BLOOD PRESSURE: 107 MMHG | WEIGHT: 165 LBS | SYSTOLIC BLOOD PRESSURE: 185 MMHG | BODY MASS INDEX: 30.36 KG/M2

## 2024-05-20 DIAGNOSIS — C82.09 GRADE I FOLLICULAR LYMPHOMA OF EXTRANODAL SITE EXCLUDING SPLEEN AND OTHER SOLID ORGANS: ICD-10-CM

## 2024-05-20 DIAGNOSIS — C82.09 GRADE I FOLLICULAR LYMPHOMA OF EXTRANODAL SITE EXCLUDING SPLEEN AND OTHER SOLID ORGANS: Primary | ICD-10-CM

## 2024-05-20 LAB
ALBUMIN SERPL-MCNC: 4 G/DL (ref 3.5–5.2)
ALBUMIN/GLOB SERPL: 1.5 G/DL
ALP SERPL-CCNC: 119 U/L (ref 39–117)
ALT SERPL W P-5'-P-CCNC: 29 U/L (ref 1–33)
ANION GAP SERPL CALCULATED.3IONS-SCNC: 9 MMOL/L (ref 5–15)
AST SERPL-CCNC: 35 U/L (ref 1–32)
BASOPHILS # BLD AUTO: 0.04 10*3/MM3 (ref 0–0.2)
BASOPHILS NFR BLD AUTO: 0.9 % (ref 0–1.5)
BILIRUB SERPL-MCNC: 1 MG/DL (ref 0–1.2)
BUN SERPL-MCNC: 16 MG/DL (ref 8–23)
BUN/CREAT SERPL: 18.2 (ref 7–25)
CALCIUM SPEC-SCNC: 9.2 MG/DL (ref 8.6–10.5)
CHLORIDE SERPL-SCNC: 107 MMOL/L (ref 98–107)
CO2 SERPL-SCNC: 27 MMOL/L (ref 22–29)
CREAT BLDA-MCNC: 1.1 MG/DL (ref 0.6–1.3)
CREAT SERPL-MCNC: 0.88 MG/DL (ref 0.57–1)
DEPRECATED RDW RBC AUTO: 43.1 FL (ref 37–54)
EGFRCR SERPLBLD CKD-EPI 2021: 69.5 ML/MIN/1.73
EOSINOPHIL # BLD AUTO: 0.08 10*3/MM3 (ref 0–0.4)
EOSINOPHIL NFR BLD AUTO: 1.7 % (ref 0.3–6.2)
ERYTHROCYTE [DISTWIDTH] IN BLOOD BY AUTOMATED COUNT: 12 % (ref 12.3–15.4)
GLOBULIN UR ELPH-MCNC: 2.6 GM/DL
GLUCOSE SERPL-MCNC: 103 MG/DL (ref 65–99)
HCT VFR BLD AUTO: 43.5 % (ref 34–46.6)
HGB BLD-MCNC: 14.7 G/DL (ref 12–15.9)
IMM GRANULOCYTES # BLD AUTO: 0.01 10*3/MM3 (ref 0–0.05)
IMM GRANULOCYTES NFR BLD AUTO: 0.2 % (ref 0–0.5)
LDH SERPL-CCNC: 212 U/L (ref 135–214)
LYMPHOCYTES # BLD AUTO: 1.32 10*3/MM3 (ref 0.7–3.1)
LYMPHOCYTES NFR BLD AUTO: 28.5 % (ref 19.6–45.3)
MCH RBC QN AUTO: 32.1 PG (ref 26.6–33)
MCHC RBC AUTO-ENTMCNC: 33.8 G/DL (ref 31.5–35.7)
MCV RBC AUTO: 95 FL (ref 79–97)
MONOCYTES # BLD AUTO: 0.35 10*3/MM3 (ref 0.1–0.9)
MONOCYTES NFR BLD AUTO: 7.6 % (ref 5–12)
NEUTROPHILS NFR BLD AUTO: 2.83 10*3/MM3 (ref 1.7–7)
NEUTROPHILS NFR BLD AUTO: 61.1 % (ref 42.7–76)
PLATELET # BLD AUTO: 159 10*3/MM3 (ref 140–450)
PMV BLD AUTO: 10.7 FL (ref 6–12)
POTASSIUM SERPL-SCNC: 4.5 MMOL/L (ref 3.5–5.2)
PROT SERPL-MCNC: 6.6 G/DL (ref 6–8.5)
RBC # BLD AUTO: 4.58 10*6/MM3 (ref 3.77–5.28)
SODIUM SERPL-SCNC: 143 MMOL/L (ref 136–145)
URATE SERPL-MCNC: 5.2 MG/DL (ref 2.4–5.7)
WBC NRBC COR # BLD AUTO: 4.63 10*3/MM3 (ref 3.4–10.8)

## 2024-05-20 PROCEDURE — 36415 COLL VENOUS BLD VENIPUNCTURE: CPT

## 2024-05-20 PROCEDURE — 85025 COMPLETE CBC W/AUTO DIFF WBC: CPT

## 2024-05-20 PROCEDURE — 84550 ASSAY OF BLOOD/URIC ACID: CPT

## 2024-05-20 PROCEDURE — 82565 ASSAY OF CREATININE: CPT

## 2024-05-20 PROCEDURE — 99214 OFFICE O/P EST MOD 30 MIN: CPT | Performed by: INTERNAL MEDICINE

## 2024-05-20 PROCEDURE — 71260 CT THORAX DX C+: CPT

## 2024-05-20 PROCEDURE — 25510000001 IOPAMIDOL 61 % SOLUTION: Performed by: INTERNAL MEDICINE

## 2024-05-20 PROCEDURE — 83615 LACTATE (LD) (LDH) ENZYME: CPT

## 2024-05-20 PROCEDURE — 74177 CT ABD & PELVIS W/CONTRAST: CPT

## 2024-05-20 PROCEDURE — 80053 COMPREHEN METABOLIC PANEL: CPT

## 2024-05-20 RX ADMIN — IOPAMIDOL 60 ML: 612 INJECTION, SOLUTION INTRAVENOUS at 10:45

## 2024-05-20 NOTE — LETTER
May 20, 2024       No Recipients    Patient: Cata Hidalgo   YOB: 1950   Date of Visit: 5/20/2024     Dear Santiago Collado, DO:       Thank you for referring Cata Hidalgo to me for evaluation. Below are the relevant portions of my assessment and plan of care.    If you have questions, please do not hesitate to call me. I look forward to following Cata along with you.         Sincerely,        En Carballo MD        CC:   No Recipients    En Carballo MD  05/20/24 1314  Sign when Signing Visit  CHIEF COMPLAINT: No new somatic complaints    Problem List:  Oncology/Hematology History Overview Note   1.  Follicular low-grade B-cell lymphoma terminal ileum with negative CT scan chest abdomen pelvis and PET.  No other obvious GI involvement on PillCam.  1/29/2024 through 2/19/2024 Rituxan weekly x 4.  In ED on CT chest abdomen pelvis April 2024  2.  Stage IIIa chronic kidney disease  3.  AVM without bleeding      Hematology oncology history timeline:  -9/23/2023 stool cultures negative for pathogens  -10/6/2023 EGD and colonoscopy Dr. Ortiz.  Antral biopsies showed minimal chronic gastritis H. pylori negative.  Transverse colon has a serrated sessile polyp negative for dysplasia.  Ileum biopsy showed atypical lymphoid infiltrate with some monotony.  Extra departmental review by [facility] consultant agreed with atypical lymphoid infiltrate that would support a low-grade follicular lymphoma but clinical correlation is essential per consultant.  Cells are positive for CD20 and less positive for CD3 and CD5.  Bcl-2 and CD10 strongly positive negative for cyclin D1.  -10/31/2023 CT chest abdomen pelvis with contrast with no worrisome findings for occult neoplasm.  Normal CBC and differential.  CMP unremarkable save for creatinine 1.17 GFR 49.7.  LDH mildly elevated 303 upper limit of normal 214.  -12/5/2023 colonoscopy 4 small ulcerationsin distal tumor ileum showed sigmoid polyp  tubular adenoma. No colitis. Terminal ileum biopsy showed atypical lambda restricted clonal lymphoid proliferation consistent with low-grade lymphoproliferative disorder such as low-grade follicular lymphoma.  Addendum: Immunostain for H. pylori and Campylobacter is negative.    -1/4/2024 [facility] oncology hematology consult: Patient has felt vaguely poorly for the better part of the year.  Around September started having nausea and occasional vomiting and occasional diarrhea but mostly just progressive fatigue despite no anemia.  Endoscopy in October and again in December showed ulcerations in the distal ileum without colitis but with lambda restricted clonal B-cell low-grade follicular lymphoma.  This is unlikely to be the immunoproliferative alpha heavy chain related MALT lymphoma but to be more likely the nonimmunoproliferative low-grade follicular lymphoma that may be associated with inflammatory bowel though the ileal biopsies do not confirm colitis nor did the CAT scan show any colitis.  PET scans are not generally extremely helpful with low-grade lymphomas but given that we do not have any other easily measurable disease I think it is reasonable to check a PET for that as well as to be sure there is no unexpectedly hot areas that may have discordant histology that may need more aggressive therapy if higher grade.  If the pet does light up vaguely, that would not distinguish inflammatory bowel from low-grade follicular B-cell lymphoma of the small bowel as they would both light up similarly.  I have communicated with Dr. Ortiz to get the ileum biopsy from December stain for H. pylori and Campylobacter jejuni and I will check her CBC, CMP, HIV, hep B, hep C, H. pylori breath test, and alpha heavy chains along with serum immunofixation electrophoresis.  At the end of the day, treatment will largely be guided by her symptoms which while nebulous are significant enough that I suspect we will end up giving  her Rituxan in the next month or 2 pending results of these tests.  She understands the common dilemma with low-grade lymphomas of deciding when to treat as there is a very fine risk-benefit ratio balance.She has had frequent upper respiratory infections and I will check her quantitative immunoglobulins as well.    -1/4/2024 data:  CBC normal with white count 5580 hemoglobin 15.4 platelets 204,000 and normal differential.  Glucose 108 creatinine 1.02 GFR 58 chloride 110 alk phos 129 otherwise unremarkable CMP.  Negative H. pylori breath test.  Normal serum immunoglobulin G/A/M kappa and lambda light chains with normal heavy to light chain ratio.  Normal quantitative immunoglobulins with no serum M spike.  Hepatitis C and B antibodies negative.  HIV 1 and 2 negative.   upper limit of normal 214.  Uric acid 5.9 upper limit of normal 5.7.  Sedimentation rate 19 normal.    -1/11/24 PET showed no hypermetabolism with normal-appearing bowel.    -1/18/2024 [facility] oncology telemedicine follow-up: I reviewed the above data with the patient.  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  She does have a capsule endoscopy for Tuesday of next week and I have left a message with Dr. 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  In order to know whether or not treatment is effective, she will need repeat endoscopy about 3 months out from treatment as the scans not surprisingly are entirely negative and I do not think we will have measurable disease to assess response either serologically or radiographically.  I have communicated this as well to Dr. Ortiz.  Presuming we proceed with his track I will see her the month out from her final weekly Rituxan. Odds of high-grade lymphoma would seem unlikely with minimally elevated LDH and uric acid but I will ask my nurse  to give uric acid depleting allopurinol prescription.    - 1/25/2024 PillCam showed small nonbleeding angiodysplasias in the jejunum.  Ulcerated and edematous mucosa with luminal stenosis in the distal ileum.  No obvious mass or evidence of lymphoma.    -1/26/2024 chemotherapy preparation visit.    -1/29/2024 through 2/19/2024 Rituxan weekly x 4.  Normal weekly CBC through this.  Consultation with nutritionists    -2/19/2024 [facility] medical oncology follow-up: Today is her fourth of 4 weekly Rituxan treatments thus far with no complications.  CBC today is unremarkable.  Will get CMP but urinating well and no hint of fluid overload from renal dysfunction.  Capsule endoscopy only showed the lesion in the ileum which previously on biopsy showed the lymphoma.  No other disease on PET.  We will repeat CTs and endoscopy with Dr. Ortiz in 3 months.  Tolerated Rituxan without complication.  Still having diarrhea which predated the Rituxan.  She cannot work with this.    -5/20/2024 CT chest abdomen pelvis with contrast compared to January PET shows stable 10 mm left thyroid nodule.  No evidence of recurrent or residual lymphoma in the chest abdomen or pelvis.  CBC normal.    -5/20/2024 [facility] medical oncology hematology follow-up: No evidence of radiographic recurrence but, again, this was not visible radiographically to begin with  "but seen on endoscopy.  She has endoscopy with Dr. Ortiz next week.  Presuming he finds nothing sinister I will repeat her CTs and labs again in 4 months.  Still has about 7 loose stools a day which is about her baseline.     Grade I follicular lymphoma of extranodal site excluding spleen and other solid organs   1/4/2024 Initial Diagnosis    Grade I follicular lymphoma of extranodal site excluding spleen and other solid organs     1/4/2024 Cancer Staged    Staging form: Hodgkin And Non-Hodgkin Lymphoma, AJCC 8th Edition  - Clinical stage from 1/4/2024: Stage IV (Follicular lymphoma) - Signed by En Carballo MD on 1/4/2024 1/29/2024 -  Chemotherapy    OP LYMPHOMA (CLL) RiTUXimab (Weekly X 4)         HISTORY OF PRESENT ILLNESS:  The patient is a 73 y.o. female, here for follow up on management of bowel lymphoma.  No new somatic complaints.    History reviewed. No pertinent past medical history.  Past Surgical History:   Procedure Laterality Date   • OTHER SURGICAL HISTORY  01/25/2024    Pill Cam       No Known Allergies    Family History and Social History reviewed and changed as necessary    REVIEW OF SYSTEM:   7 loose stools per day.  Stable for her.    PHYSICAL EXAM:  No jaundice icterus or pallor.  No respiratory distress.  No palpable cervical axillary or inguinal adenopathy.    Vitals:    05/20/24 1241   BP: (!) 185/107   Pulse: 79   Resp: 16   Temp: 98.1 °F (36.7 °C)   SpO2: 98%   Weight: 74.8 kg (165 lb)   Height: 157.5 cm (62\")     Vitals:    05/20/24 1241   PainSc: 0-No pain          ECOG score: 1           Vitals reviewed.    Lab Results   Component Value Date    HGB 14.7 05/20/2024    HCT 43.5 05/20/2024    MCV 95.0 05/20/2024     05/20/2024    WBC 4.63 05/20/2024    NEUTROABS 2.83 05/20/2024    LYMPHSABS 1.32 05/20/2024    MONOSABS 0.35 05/20/2024    EOSABS 0.08 05/20/2024    BASOSABS 0.04 05/20/2024       Lab Results   Component Value Date    GLUCOSE 103 (H) 05/20/2024    BUN 16 05/20/2024 "    CREATININE 0.88 05/20/2024     05/20/2024    K 4.5 05/20/2024     05/20/2024    CO2 27.0 05/20/2024    CALCIUM 9.2 05/20/2024    PROTEINTOT 6.6 05/20/2024    ALBUMIN 4.0 05/20/2024    BILITOT 1.0 05/20/2024    ALKPHOS 119 (H) 05/20/2024    AST 35 (H) 05/20/2024    ALT 29 05/20/2024             ASSESSMENT & PLAN:  1.  Follicular low-grade B-cell lymphoma terminal ileum with negative CT scan chest abdomen pelvis and PET.  No other obvious GI involvement on PillCam.  1/29/2024 through 2/19/2024 Rituxan weekly x 4.  In ED on CT chest abdomen pelvis April 2024  2.  Stage IIIa chronic kidney disease  3.  AVM without bleeding      Hematology oncology history timeline:  -9/23/2023 stool cultures negative for pathogens  -10/6/2023 EGD and colonoscopy Dr. Ortiz.  Antral biopsies showed minimal chronic gastritis H. pylori negative.  Transverse colon has a serrated sessile polyp negative for dysplasia.  Ileum biopsy showed atypical lymphoid infiltrate with some monotony.  Extra departmental review by [facility] consultant agreed with atypical lymphoid infiltrate that would support a low-grade follicular lymphoma but clinical correlation is essential per consultant.  Cells are positive for CD20 and less positive for CD3 and CD5.  Bcl-2 and CD10 strongly positive negative for cyclin D1.  -10/31/2023 CT chest abdomen pelvis with contrast with no worrisome findings for occult neoplasm.  Normal CBC and differential.  CMP unremarkable save for creatinine 1.17 GFR 49.7.  LDH mildly elevated 303 upper limit of normal 214.  -12/5/2023 colonoscopy 4 small ulcerationsin distal tumor ileum showed sigmoid polyp tubular adenoma. No colitis. Terminal ileum biopsy showed atypical lambda restricted clonal lymphoid proliferation consistent with low-grade lymphoproliferative disorder such as low-grade follicular lymphoma.  Addendum: Immunostain for H. pylori and Campylobacter is negative.    -1/4/2024 [facility]  medical oncology hematology consult: Patient has felt vaguely poorly for the better part of the year.  Around September started having nausea and occasional vomiting and occasional diarrhea but mostly just progressive fatigue despite no anemia.  Endoscopy in October and again in December showed ulcerations in the distal ileum without colitis but with lambda restricted clonal B-cell low-grade follicular lymphoma.  This is unlikely to be the immunoproliferative alpha heavy chain related MALT lymphoma but to be more likely the nonimmunoproliferative low-grade follicular lymphoma that may be associated with inflammatory bowel though the ileal biopsies do not confirm colitis nor did the CAT scan show any colitis.  PET scans are not generally extremely helpful with low-grade lymphomas but given that we do not have any other easily measurable disease I think it is reasonable to check a PET for that as well as to be sure there is no unexpectedly hot areas that may have discordant histology that may need more aggressive therapy if higher grade.  If the pet does light up vaguely, that would not distinguish inflammatory bowel from low-grade follicular B-cell lymphoma of the small bowel as they would both light up similarly.  I have communicated with Dr. Ortiz to get the ileum biopsy from December stain for H. pylori and Campylobacter jejuni and I will check her CBC, CMP, HIV, hep B, hep C, H. pylori breath test, and alpha heavy chains along with serum immunofixation electrophoresis.  At the end of the day, treatment will largely be guided by her symptoms which while nebulous are significant enough that I suspect we will end up giving her Rituxan in the next month or 2 pending results of these tests.  She understands the common dilemma with low-grade lymphomas of deciding when to treat as there is a very fine risk-benefit ratio balance.She has had frequent upper respiratory infections and I will check her quantitative  immunoglobulins as well.    -1/4/2024 data:  CBC normal with white count 5580 hemoglobin 15.4 platelets 204,000 and normal differential.  Glucose 108 creatinine 1.02 GFR 58 chloride 110 alk phos 129 otherwise unremarkable CMP.  Negative H. pylori breath test.  Normal serum immunoglobulin G/A/M kappa and lambda light chains with normal heavy to light chain ratio.  Normal quantitative immunoglobulins with no serum M spike.  Hepatitis C and B antibodies negative.  HIV 1 and 2 negative.   upper limit of normal 214.  Uric acid 5.9 upper limit of normal 5.7.  Sedimentation rate 19 normal.    -1/11/24 PET showed no hypermetabolism with normal-appearing bowel.    -1/18/2024 [facility] oncology telemedicine follow-up: I reviewed the above data with the patient.  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  She does have a capsule endoscopy for Tuesday of next week and I have left a message with Dr. 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  In order to know whether or not treatment is effective, she will need repeat endoscopy about 3 months out from treatment as the scans not surprisingly are entirely negative and I do not think we will have measurable disease to assess  response either serologically or radiographically.  I have communicated this as well to Dr. Ortiz.  Presuming we proceed with his track I will see her the month out from her final weekly Rituxan. Odds of high-grade lymphoma would seem unlikely with minimally elevated LDH and uric acid but I will ask my nurse  to give uric acid depleting allopurinol prescription.    - 1/25/2024 PillCam showed small nonbleeding angiodysplasias in the jejunum.  Ulcerated and edematous mucosa with luminal stenosis in the distal ileum.  No obvious mass or evidence of lymphoma.    -1/26/2024 chemotherapy preparation visit.    -1/29/2024 through 2/19/2024 Rituxan weekly x 4.  Normal weekly CBC through this.  Consultation with nutritionists    -2/19/2024 [facility] medical oncology follow-up: Today is her fourth of 4 weekly Rituxan treatments thus far with no complications.  CBC today is unremarkable.  Will get CMP but urinating well and no hint of fluid overload from renal dysfunction.  Capsule endoscopy only showed the lesion in the ileum which previously on biopsy showed the lymphoma.  No other disease on PET.  We will repeat CTs and endoscopy with Dr. Ortiz in 3 months.  Tolerated Rituxan without complication.  Still having diarrhea which predated the Rituxan.  She cannot work with this.    -5/20/2024 CT chest abdomen pelvis with contrast compared to January PET shows stable 10 mm left thyroid nodule.  No evidence of recurrent or residual lymphoma in the chest abdomen or pelvis.  CBC normal.    -5/20/2024 [facility] oncology hematology follow-up: No evidence of radiographic recurrence but, again, this was not visible radiographically to begin with but seen on endoscopy.  She has endoscopy with Dr. Ortiz next week.  Presuming he finds nothing sinister I will repeat her CTs and labs again in 4 months.  Still has about 7 loose stools a day which is about her baseline.  She will get her blood pressure checked on her way home with her  primary care to see if it is better.  She was quite anxious.    Total time of care today inclusive of time spent today prior to patient's arrival reviewing interval images and reports thereof and during visit interviewing patient as to signs or symptoms of her disease and the treatment thereof and after visit instituting the plan as outlined above took 33 minutes patient care time throughout the day today.  En Carballo MD    05/20/2024

## 2024-05-20 NOTE — PROGRESS NOTES
CHIEF COMPLAINT: No new somatic complaints    Problem List:  Oncology/Hematology History Overview Note   1.  Follicular low-grade B-cell lymphoma terminal ileum with negative CT scan chest abdomen pelvis and PET.  No other obvious GI involvement on PillCam.  1/29/2024 through 2/19/2024 Rituxan weekly x 4.  In ED on CT chest abdomen pelvis April 2024  2.  Stage IIIa chronic kidney disease  3.  AVM without bleeding      Hematology oncology history timeline:  -9/23/2023 stool cultures negative for pathogens  -10/6/2023 EGD and colonoscopy Dr. Ortiz.  Antral biopsies showed minimal chronic gastritis H. pylori negative.  Transverse colon has a serrated sessile polyp negative for dysplasia.  Ileum biopsy showed atypical lymphoid infiltrate with some monotony.  Extra departmental review by [facility] [place] [facility] consultant agreed with atypical lymphoid infiltrate that would support a low-grade follicular lymphoma but clinical correlation is essential per consultant.  Cells are positive for CD20 and less positive for CD3 and CD5.  Bcl-2 and CD10 strongly positive negative for cyclin D1.  -10/31/2023 CT chest abdomen pelvis with contrast with no worrisome findings for occult neoplasm.  Normal CBC and differential.  CMP unremarkable save for creatinine 1.17 GFR 49.7.  LDH mildly elevated 303 upper limit of normal 214.  -12/5/2023 colonoscopy 4 small ulcerationsin distal tumor ileum showed sigmoid polyp tubular adenoma. No colitis. Terminal ileum biopsy showed atypical lambda restricted clonal lymphoid proliferation consistent with low-grade lymphoproliferative disorder such as low-grade follicular lymphoma.  Addendum: Immunostain for H. pylori and Campylobacter is negative.    -1/4/2024 [facility] medical oncology hematology consult: Patient has felt vaguely poorly for the better part of the year.  Around September started having nausea and occasional vomiting and occasional diarrhea but mostly just progressive fatigue  despite no anemia.  Endoscopy in October and again in December showed ulcerations in the distal ileum without colitis but with lambda restricted clonal B-cell low-grade follicular lymphoma.  This is unlikely to be the immunoproliferative alpha heavy chain related MALT lymphoma but to be more likely the nonimmunoproliferative low-grade follicular lymphoma that may be associated with inflammatory bowel though the ileal biopsies do not confirm colitis nor did the CAT scan show any colitis.  PET scans are not generally extremely helpful with low-grade lymphomas but given that we do not have any other easily measurable disease I think it is reasonable to check a PET for that as well as to be sure there is no unexpectedly hot areas that may have discordant histology that may need more aggressive therapy if higher grade.  If the pet does light up vaguely, that would not distinguish inflammatory bowel from low-grade follicular B-cell lymphoma of the small bowel as they would both light up similarly.  I have communicated with Dr. Ortiz to get the ileum biopsy from December stain for H. pylori and Campylobacter jejuni and I will check her CBC, CMP, HIV, hep B, hep C, H. pylori breath test, and alpha heavy chains along with serum immunofixation electrophoresis.  At the end of the day, treatment will largely be guided by her symptoms which while nebulous are significant enough that I suspect we will end up giving her Rituxan in the next month or 2 pending results of these tests.  She understands the common dilemma with low-grade lymphomas of deciding when to treat as there is a very fine risk-benefit ratio balance.She has had frequent upper respiratory infections and I will check her quantitative immunoglobulins as well.    -1/4/2024 data:  CBC normal with white count 5580 hemoglobin 15.4 platelets 204,000 and normal differential.  Glucose 108 creatinine 1.02 GFR 58 chloride 110 alk phos 129 otherwise unremarkable  CMP.  Negative H. pylori breath test.  Normal serum immunoglobulin G/A/M kappa and lambda light chains with normal heavy to light chain ratio.  Normal quantitative immunoglobulins with no serum M spike.  Hepatitis C and B antibodies negative.  HIV 1 and 2 negative.   upper limit of normal 214.  Uric acid 5.9 upper limit of normal 5.7.  Sedimentation rate 19 normal.    -1/11/24 PET showed no hypermetabolism with normal-appearing bowel.    -1/18/2024 [facility] oncology telemedicine follow-up: I reviewed the above data with the patient.  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  She does have a capsule endoscopy for Tuesday of next week and I have left a message with Dr. 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  In order to know whether or not treatment is effective, she will need repeat endoscopy about 3 months out from treatment as the scans not surprisingly are entirely negative and I do not think we will have measurable disease to assess response either serologically or radiographically.  I have communicated this as well to Dr. Ortiz.  Presuming we proceed with his track I will see her the month out from her final weekly Rituxan. Odds of high-grade lymphoma  would seem unlikely with minimally elevated LDH and uric acid but I will ask my nurse  to give uric acid depleting allopurinol prescription.    - 1/25/2024 PillCam showed small nonbleeding angiodysplasias in the jejunum.  Ulcerated and edematous mucosa with luminal stenosis in the distal ileum.  No obvious mass or evidence of lymphoma.    -1/26/2024 chemotherapy preparation visit.    -1/29/2024 through 2/19/2024 Rituxan weekly x 4.  Normal weekly CBC through this.  Consultation with nutritionists    -2/19/2024 [facility] medical oncology follow-up: Today is her fourth of 4 weekly Rituxan treatments thus far with no complications.  CBC today is unremarkable.  Will get CMP but urinating well and no hint of fluid overload from renal dysfunction.  Capsule endoscopy only showed the lesion in the ileum which previously on biopsy showed the lymphoma.  No other disease on PET.  We will repeat CTs and endoscopy with Dr. Ortiz in 3 months.  Tolerated Rituxan without complication.  Still having diarrhea which predated the Rituxan.  She cannot work with this.    -5/20/2024 CT chest abdomen pelvis with contrast compared to January PET shows stable 10 mm left thyroid nodule.  No evidence of recurrent or residual lymphoma in the chest abdomen or pelvis.  CBC normal.    -5/20/2024 [facility] oncology hematology follow-up: No evidence of radiographic recurrence but, again, this was not visible radiographically to begin with but seen on endoscopy.  She has endoscopy with Dr. Ortiz next week.  Presuming he finds nothing sinister I will repeat her CTs and labs again in 4 months.  Still has about 7 loose stools a day which is about her baseline.     Grade I follicular lymphoma of extranodal site excluding spleen and other solid organs   1/4/2024 Initial Diagnosis    Grade I follicular lymphoma of extranodal site excluding spleen and other solid organs     1/4/2024 Cancer Staged    Staging form: Hodgkin And Non-Hodgkin Lymphoma, AJCC  "8th Edition  - Clinical stage from 1/4/2024: Stage IV (Follicular lymphoma) - Signed by En Carballo MD on 1/4/2024 1/29/2024 -  Chemotherapy    OP LYMPHOMA (CLL) RiTUXimab (Weekly X 4)         HISTORY OF PRESENT ILLNESS:  The patient is a 73 y.o. female, here for follow up on management of bowel lymphoma.  No new somatic complaints.    History reviewed. No pertinent past medical history.  Past Surgical History:   Procedure Laterality Date    OTHER SURGICAL HISTORY  01/25/2024    Pill Cam       No Known Allergies    Family History and Social History reviewed and changed as necessary    REVIEW OF SYSTEM:   7 loose stools per day.  Stable for her.    PHYSICAL EXAM:  No jaundice icterus or pallor.  No respiratory distress.  No palpable cervical axillary or inguinal adenopathy.    Vitals:    05/20/24 1241   BP: (!) 185/107   Pulse: 79   Resp: 16   Temp: 98.1 °F (36.7 °C)   SpO2: 98%   Weight: 74.8 kg (165 lb)   Height: 157.5 cm (62\")     Vitals:    05/20/24 1241   PainSc: 0-No pain          ECOG score: 1           Vitals reviewed.    Lab Results   Component Value Date    HGB 14.7 05/20/2024    HCT 43.5 05/20/2024    MCV 95.0 05/20/2024     05/20/2024    WBC 4.63 05/20/2024    NEUTROABS 2.83 05/20/2024    LYMPHSABS 1.32 05/20/2024    MONOSABS 0.35 05/20/2024    EOSABS 0.08 05/20/2024    BASOSABS 0.04 05/20/2024       Lab Results   Component Value Date    GLUCOSE 103 (H) 05/20/2024    BUN 16 05/20/2024    CREATININE 0.88 05/20/2024     05/20/2024    K 4.5 05/20/2024     05/20/2024    CO2 27.0 05/20/2024    CALCIUM 9.2 05/20/2024    PROTEINTOT 6.6 05/20/2024    ALBUMIN 4.0 05/20/2024    BILITOT 1.0 05/20/2024    ALKPHOS 119 (H) 05/20/2024    AST 35 (H) 05/20/2024    ALT 29 05/20/2024             ASSESSMENT & PLAN:  1.  Follicular low-grade B-cell lymphoma terminal ileum with negative CT scan chest abdomen pelvis and PET.  No other obvious GI involvement on PillCam.  1/29/2024 through 2/19/2024 " Rituxan weekly x 4.  In ED on CT chest abdomen pelvis April 2024  2.  Stage IIIa chronic kidney disease  3.  AVM without bleeding      Hematology oncology history timeline:  -9/23/2023 stool cultures negative for pathogens  -10/6/2023 EGD and colonoscopy Dr. Ortiz.  Antral biopsies showed minimal chronic gastritis H. pylori negative.  Transverse colon has a serrated sessile polyp negative for dysplasia.  Ileum biopsy showed atypical lymphoid infiltrate with some monotony.  Extra departmental review by [facility] [place] [facility] consultant agreed with atypical lymphoid infiltrate that would support a low-grade follicular lymphoma but clinical correlation is essential per consultant.  Cells are positive for CD20 and less positive for CD3 and CD5.  Bcl-2 and CD10 strongly positive negative for cyclin D1.  -10/31/2023 CT chest abdomen pelvis with contrast with no worrisome findings for occult neoplasm.  Normal CBC and differential.  CMP unremarkable save for creatinine 1.17 GFR 49.7.  LDH mildly elevated 303 upper limit of normal 214.  -12/5/2023 colonoscopy 4 small ulcerationsin distal tumor ileum showed sigmoid polyp tubular adenoma. No colitis. Terminal ileum biopsy showed atypical lambda restricted clonal lymphoid proliferation consistent with low-grade lymphoproliferative disorder such as low-grade follicular lymphoma.  Addendum: Immunostain for H. pylori and Campylobacter is negative.    -1/4/2024 [facility] medical oncology hematology consult: Patient has felt vaguely poorly for the better part of the year.  Around September started having nausea and occasional vomiting and occasional diarrhea but mostly just progressive fatigue despite no anemia.  Endoscopy in October and again in December showed ulcerations in the distal ileum without colitis but with lambda restricted clonal B-cell low-grade follicular lymphoma.  This is unlikely to be the immunoproliferative alpha heavy chain related MALT lymphoma but to be more  likely the nonimmunoproliferative low-grade follicular lymphoma that may be associated with inflammatory bowel though the ileal biopsies do not confirm colitis nor did the CAT scan show any colitis.  PET scans are not generally extremely helpful with low-grade lymphomas but given that we do not have any other easily measurable disease I think it is reasonable to check a PET for that as well as to be sure there is no unexpectedly hot areas that may have discordant histology that may need more aggressive therapy if higher grade.  If the pet does light up vaguely, that would not distinguish inflammatory bowel from low-grade follicular B-cell lymphoma of the small bowel as they would both light up similarly.  I have communicated with Dr. Ortiz to get the ileum biopsy from December stain for H. pylori and Campylobacter jejuni and I will check her CBC, CMP, HIV, hep B, hep C, H. pylori breath test, and alpha heavy chains along with serum immunofixation electrophoresis.  At the end of the day, treatment will largely be guided by her symptoms which while nebulous are significant enough that I suspect we will end up giving her Rituxan in the next month or 2 pending results of these tests.  She understands the common dilemma with low-grade lymphomas of deciding when to treat as there is a very fine risk-benefit ratio balance.She has had frequent upper respiratory infections and I will check her quantitative immunoglobulins as well.    -1/4/2024 data:  CBC normal with white count 5580 hemoglobin 15.4 platelets 204,000 and normal differential.  Glucose 108 creatinine 1.02 GFR 58 chloride 110 alk phos 129 otherwise unremarkable CMP.  Negative H. pylori breath test.  Normal serum immunoglobulin G/A/M kappa and lambda light chains with normal heavy to light chain ratio.  Normal quantitative immunoglobulins with no serum M spike.  Hepatitis C and B antibodies negative.  HIV 1 and 2 negative.   upper limit of normal 214.   Uric acid 5.9 upper limit of normal 5.7.  Sedimentation rate 19 normal.    -1/11/24 PET showed no hypermetabolism with normal-appearing bowel.    -1/18/2024 [facility] oncology telemedicine follow-up: I reviewed the above data with the patient.  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  She does have a capsule endoscopy for Tuesday of next week and I have left a message with Dr. 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  In order to know whether or not treatment is effective, she will need repeat endoscopy about 3 months out from treatment as the scans not surprisingly are entirely negative and I do not think we will have measurable disease to assess response either serologically or radiographically.  I have communicated this as well to Dr. Ortiz.  Presuming we proceed with his track I will see her the month out from her final weekly Rituxan. Odds of high-grade lymphoma would seem unlikely with minimally elevated LDH and uric acid but I will ask my nurse  to give uric acid depleting allopurinol prescription.    - 1/25/2024 PillCam showed small nonbleeding angiodysplasias in the jejunum.  Ulcerated and edematous mucosa with luminal stenosis in the distal ileum.  No  obvious mass or evidence of lymphoma.    -1/26/2024 chemotherapy preparation visit.    -1/29/2024 through 2/19/2024 Rituxan weekly x 4.  Normal weekly CBC through this.  Consultation with nutritionists    -2/19/2024 [facility] oncology follow-up: Today is her fourth of 4 weekly Rituxan treatments thus far with no complications.  CBC today is unremarkable.  Will get CMP but urinating well and no hint of fluid overload from renal dysfunction.  Capsule endoscopy only showed the lesion in the ileum which previously on biopsy showed the lymphoma.  No other disease on PET.  We will repeat CTs and endoscopy with Dr. Ortiz in 3 months.  Tolerated Rituxan without complication.  Still having diarrhea which predated the Rituxan.  She cannot work with this.    -5/20/2024 CT chest abdomen pelvis with contrast compared to January PET shows stable 10 mm left thyroid nodule.  No evidence of recurrent or residual lymphoma in the chest abdomen or pelvis.  CBC normal.    -5/20/2024 [facility] oncology hematology follow-up: No evidence of radiographic recurrence but, again, this was not visible radiographically to begin with but seen on endoscopy.  She has endoscopy with Dr. Ortiz next week.  Presuming he finds nothing sinister I will repeat her CTs and labs again in 4 months.  Still has about 7 loose stools a day which is about her baseline.  She will get her blood pressure checked on her way home with her primary care to see if it is better.  She was quite anxious.    Total time of care today inclusive of time spent today prior to patient's arrival reviewing interval images and reports thereof and during visit interviewing patient as to signs or symptoms of her disease and the treatment thereof and after visit instituting the plan as outlined above took 33 minutes patient care time throughout the day today.  En Carballo MD    05/20/2024

## 2024-09-20 ENCOUNTER — LAB (OUTPATIENT)
Dept: LAB | Facility: HOSPITAL | Age: 74
End: 2024-09-20
Payer: MEDICARE

## 2024-09-20 ENCOUNTER — HOSPITAL ENCOUNTER (OUTPATIENT)
Dept: CT IMAGING | Facility: HOSPITAL | Age: 74
Discharge: HOME | End: 2024-09-20
Payer: MEDICARE

## 2024-09-20 ENCOUNTER — OFFICE VISIT (OUTPATIENT)
Dept: ONCOLOGY | Facility: CLINIC | Age: 74
End: 2024-09-20
Payer: MEDICARE

## 2024-09-20 VITALS
RESPIRATION RATE: 18 BRPM | BODY MASS INDEX: 30.18 KG/M2 | TEMPERATURE: 97.7 F | HEIGHT: 62 IN | OXYGEN SATURATION: 96 % | HEART RATE: 78 BPM | WEIGHT: 164 LBS | DIASTOLIC BLOOD PRESSURE: 89 MMHG | SYSTOLIC BLOOD PRESSURE: 169 MMHG

## 2024-09-20 DIAGNOSIS — C82.09 GRADE I FOLLICULAR LYMPHOMA OF EXTRANODAL SITE EXCLUDING SPLEEN AND OTHER SOLID ORGANS: Primary | ICD-10-CM

## 2024-09-20 DIAGNOSIS — C82.09 GRADE I FOLLICULAR LYMPHOMA OF EXTRANODAL SITE EXCLUDING SPLEEN AND OTHER SOLID ORGANS: ICD-10-CM

## 2024-09-20 LAB
ALBUMIN SERPL-MCNC: 4.1 G/DL (ref 3.5–5.2)
ALBUMIN/GLOB SERPL: 1.5 G/DL
ALP SERPL-CCNC: 136 U/L (ref 39–117)
ALT SERPL W P-5'-P-CCNC: 28 U/L (ref 1–33)
ANION GAP SERPL CALCULATED.3IONS-SCNC: 7 MMOL/L (ref 5–15)
AST SERPL-CCNC: 33 U/L (ref 1–32)
BASOPHILS # BLD AUTO: 0.02 10*3/MM3 (ref 0–0.2)
BASOPHILS NFR BLD AUTO: 0.4 % (ref 0–1.5)
BILIRUB SERPL-MCNC: 0.9 MG/DL (ref 0–1.2)
BUN SERPL-MCNC: 16 MG/DL (ref 8–23)
BUN/CREAT SERPL: 17.4 (ref 7–25)
CALCIUM SPEC-SCNC: 9.5 MG/DL (ref 8.6–10.5)
CHLORIDE SERPL-SCNC: 105 MMOL/L (ref 98–107)
CO2 SERPL-SCNC: 27 MMOL/L (ref 22–29)
CREAT SERPL-MCNC: 0.92 MG/DL (ref 0.57–1)
DEPRECATED RDW RBC AUTO: 42.5 FL (ref 37–54)
EGFRCR SERPLBLD CKD-EPI 2021: 65.9 ML/MIN/1.73
EOSINOPHIL # BLD AUTO: 0.09 10*3/MM3 (ref 0–0.4)
EOSINOPHIL NFR BLD AUTO: 1.9 % (ref 0.3–6.2)
ERYTHROCYTE [DISTWIDTH] IN BLOOD BY AUTOMATED COUNT: 11.8 % (ref 12.3–15.4)
GLOBULIN UR ELPH-MCNC: 2.7 GM/DL
GLUCOSE SERPL-MCNC: 102 MG/DL (ref 65–99)
HCT VFR BLD AUTO: 44.9 % (ref 34–46.6)
HGB BLD-MCNC: 15 G/DL (ref 12–15.9)
IMM GRANULOCYTES # BLD AUTO: 0.01 10*3/MM3 (ref 0–0.05)
IMM GRANULOCYTES NFR BLD AUTO: 0.2 % (ref 0–0.5)
LDH SERPL-CCNC: 238 U/L (ref 135–214)
LYMPHOCYTES # BLD AUTO: 1.2 10*3/MM3 (ref 0.7–3.1)
LYMPHOCYTES NFR BLD AUTO: 25.8 % (ref 19.6–45.3)
MCH RBC QN AUTO: 32 PG (ref 26.6–33)
MCHC RBC AUTO-ENTMCNC: 33.4 G/DL (ref 31.5–35.7)
MCV RBC AUTO: 95.7 FL (ref 79–97)
MONOCYTES # BLD AUTO: 0.33 10*3/MM3 (ref 0.1–0.9)
MONOCYTES NFR BLD AUTO: 7.1 % (ref 5–12)
NEUTROPHILS NFR BLD AUTO: 3 10*3/MM3 (ref 1.7–7)
NEUTROPHILS NFR BLD AUTO: 64.6 % (ref 42.7–76)
PLATELET # BLD AUTO: 168 10*3/MM3 (ref 140–450)
PMV BLD AUTO: 10.1 FL (ref 6–12)
POTASSIUM SERPL-SCNC: 4.9 MMOL/L (ref 3.5–5.2)
PROT SERPL-MCNC: 6.8 G/DL (ref 6–8.5)
RBC # BLD AUTO: 4.69 10*6/MM3 (ref 3.77–5.28)
SODIUM SERPL-SCNC: 139 MMOL/L (ref 136–145)
URATE SERPL-MCNC: 5 MG/DL (ref 2.4–5.7)
WBC NRBC COR # BLD AUTO: 4.65 10*3/MM3 (ref 3.4–10.8)

## 2024-09-20 PROCEDURE — 80053 COMPREHEN METABOLIC PANEL: CPT

## 2024-09-20 PROCEDURE — 36415 COLL VENOUS BLD VENIPUNCTURE: CPT

## 2024-09-20 PROCEDURE — 85025 COMPLETE CBC W/AUTO DIFF WBC: CPT

## 2024-09-20 PROCEDURE — 74177 CT ABD & PELVIS W/CONTRAST: CPT

## 2024-09-20 PROCEDURE — 83615 LACTATE (LD) (LDH) ENZYME: CPT

## 2024-09-20 PROCEDURE — 71260 CT THORAX DX C+: CPT

## 2024-09-20 PROCEDURE — 25510000001 IOPAMIDOL 61 % SOLUTION: Performed by: INTERNAL MEDICINE

## 2024-09-20 PROCEDURE — 84550 ASSAY OF BLOOD/URIC ACID: CPT

## 2024-09-20 RX ORDER — IOPAMIDOL 612 MG/ML
100 INJECTION, SOLUTION INTRAVASCULAR
Status: COMPLETED | OUTPATIENT
Start: 2024-09-20 | End: 2024-09-20

## 2024-09-20 RX ADMIN — IOPAMIDOL 100 ML: 612 INJECTION, SOLUTION INTRAVENOUS at 11:31

## 2025-01-27 ENCOUNTER — OFFICE VISIT (OUTPATIENT)
Dept: ONCOLOGY | Facility: CLINIC | Age: 75
End: 2025-01-27
Payer: MEDICARE

## 2025-01-27 ENCOUNTER — LAB (OUTPATIENT)
Dept: LAB | Facility: HOSPITAL | Age: 75
End: 2025-01-27
Payer: MEDICARE

## 2025-01-27 VITALS
TEMPERATURE: 100.2 F | SYSTOLIC BLOOD PRESSURE: 173 MMHG | HEIGHT: 62 IN | BODY MASS INDEX: 30.18 KG/M2 | DIASTOLIC BLOOD PRESSURE: 87 MMHG | OXYGEN SATURATION: 96 % | HEART RATE: 111 BPM | RESPIRATION RATE: 18 BRPM | WEIGHT: 164 LBS

## 2025-01-27 DIAGNOSIS — C82.09 GRADE I FOLLICULAR LYMPHOMA OF EXTRANODAL SITE EXCLUDING SPLEEN AND OTHER SOLID ORGANS: Primary | ICD-10-CM

## 2025-01-27 DIAGNOSIS — C82.09 GRADE I FOLLICULAR LYMPHOMA OF EXTRANODAL SITE EXCLUDING SPLEEN AND OTHER SOLID ORGANS: ICD-10-CM

## 2025-01-27 LAB
ALBUMIN SERPL-MCNC: 4.1 G/DL (ref 3.5–5.2)
ALBUMIN/GLOB SERPL: 1.7 G/DL
ALP SERPL-CCNC: 126 U/L (ref 39–117)
ALT SERPL W P-5'-P-CCNC: 31 U/L (ref 1–33)
ANION GAP SERPL CALCULATED.3IONS-SCNC: 10 MMOL/L (ref 5–15)
AST SERPL-CCNC: 36 U/L (ref 1–32)
BASOPHILS # BLD AUTO: 0.04 10*3/MM3 (ref 0–0.2)
BASOPHILS NFR BLD AUTO: 0.8 % (ref 0–1.5)
BILIRUB SERPL-MCNC: 0.9 MG/DL (ref 0–1.2)
BUN SERPL-MCNC: 16 MG/DL (ref 8–23)
BUN/CREAT SERPL: 17.4 (ref 7–25)
CALCIUM SPEC-SCNC: 9.6 MG/DL (ref 8.6–10.5)
CHLORIDE SERPL-SCNC: 109 MMOL/L (ref 98–107)
CO2 SERPL-SCNC: 27 MMOL/L (ref 22–29)
CREAT SERPL-MCNC: 0.92 MG/DL (ref 0.57–1)
DEPRECATED RDW RBC AUTO: 44.2 FL (ref 37–54)
EGFRCR SERPLBLD CKD-EPI 2021: 65.5 ML/MIN/1.73
EOSINOPHIL # BLD AUTO: 0.19 10*3/MM3 (ref 0–0.4)
EOSINOPHIL NFR BLD AUTO: 3.8 % (ref 0.3–6.2)
ERYTHROCYTE [DISTWIDTH] IN BLOOD BY AUTOMATED COUNT: 12.3 % (ref 12.3–15.4)
GLOBULIN UR ELPH-MCNC: 2.4 GM/DL
GLUCOSE SERPL-MCNC: 120 MG/DL (ref 65–99)
HCT VFR BLD AUTO: 43.8 % (ref 34–46.6)
HGB BLD-MCNC: 14.7 G/DL (ref 12–15.9)
IMM GRANULOCYTES # BLD AUTO: 0 10*3/MM3 (ref 0–0.05)
IMM GRANULOCYTES NFR BLD AUTO: 0 % (ref 0–0.5)
LDH SERPL-CCNC: 222 U/L (ref 135–214)
LYMPHOCYTES # BLD AUTO: 1.32 10*3/MM3 (ref 0.7–3.1)
LYMPHOCYTES NFR BLD AUTO: 26.1 % (ref 19.6–45.3)
MCH RBC QN AUTO: 32.7 PG (ref 26.6–33)
MCHC RBC AUTO-ENTMCNC: 33.6 G/DL (ref 31.5–35.7)
MCV RBC AUTO: 97.3 FL (ref 79–97)
MONOCYTES # BLD AUTO: 0.27 10*3/MM3 (ref 0.1–0.9)
MONOCYTES NFR BLD AUTO: 5.3 % (ref 5–12)
NEUTROPHILS NFR BLD AUTO: 3.23 10*3/MM3 (ref 1.7–7)
NEUTROPHILS NFR BLD AUTO: 64 % (ref 42.7–76)
PLATELET # BLD AUTO: 171 10*3/MM3 (ref 140–450)
PMV BLD AUTO: 10.3 FL (ref 6–12)
POTASSIUM SERPL-SCNC: 4 MMOL/L (ref 3.5–5.2)
PROT SERPL-MCNC: 6.5 G/DL (ref 6–8.5)
RBC # BLD AUTO: 4.5 10*6/MM3 (ref 3.77–5.28)
SODIUM SERPL-SCNC: 146 MMOL/L (ref 136–145)
URATE SERPL-MCNC: 5.3 MG/DL (ref 2.4–5.7)
WBC NRBC COR # BLD AUTO: 5.05 10*3/MM3 (ref 3.4–10.8)

## 2025-01-27 PROCEDURE — 1160F RVW MEDS BY RX/DR IN RCRD: CPT | Performed by: NURSE PRACTITIONER

## 2025-01-27 PROCEDURE — 80053 COMPREHEN METABOLIC PANEL: CPT

## 2025-01-27 PROCEDURE — 1159F MED LIST DOCD IN RCRD: CPT | Performed by: NURSE PRACTITIONER

## 2025-01-27 PROCEDURE — 36415 COLL VENOUS BLD VENIPUNCTURE: CPT

## 2025-01-27 PROCEDURE — 1126F AMNT PAIN NOTED NONE PRSNT: CPT | Performed by: NURSE PRACTITIONER

## 2025-01-27 PROCEDURE — 99214 OFFICE O/P EST MOD 30 MIN: CPT | Performed by: NURSE PRACTITIONER

## 2025-01-27 PROCEDURE — 83615 LACTATE (LD) (LDH) ENZYME: CPT

## 2025-01-27 PROCEDURE — 85025 COMPLETE CBC W/AUTO DIFF WBC: CPT

## 2025-01-27 PROCEDURE — 84550 ASSAY OF BLOOD/URIC ACID: CPT

## 2025-01-27 NOTE — PROGRESS NOTES
CHIEF COMPLAINT: 1.  Intermittent diarrhea    2.  Insomnia    3.  Itching    Problem List:  Oncology/Hematology History Overview Note   1.  Follicular low-grade B-cell lymphoma terminal ileum with negative CT scan chest abdomen pelvis and PET.  No other obvious GI involvement on PillCam.  1/29/2024 through 2/19/2024 Rituxan weekly x 4.  In ED on CT chest abdomen pelvis April 2024  2.  Stage IIIa chronic kidney disease  3.  AVM without bleeding    Hematology oncology history timeline:  -9/23/2023 stool cultures negative for pathogens  -10/6/2023 EGD and colonoscopy Dr. Ortiz.  Antral biopsies showed minimal chronic gastritis H. pylori negative.  Transverse colon has a serrated sessile polyp negative for dysplasia.  Ileum biopsy showed atypical lymphoid infiltrate with some monotony.  Extra departmental review by [facility] [place] [facility] consultant agreed with atypical lymphoid infiltrate that would support a low-grade follicular lymphoma but clinical correlation is essential per consultant.  Cells are positive for CD20 and less positive for CD3 and CD5.  Bcl-2 and CD10 strongly positive negative for cyclin D1.  -10/31/2023 CT chest abdomen pelvis with contrast with no worrisome findings for occult neoplasm.  Normal CBC and differential.  CMP unremarkable save for creatinine 1.17 GFR 49.7.  LDH mildly elevated 303 upper limit of normal 214.  -12/5/2023 colonoscopy 4 small ulcerationsin distal tumor ileum showed sigmoid polyp tubular adenoma. No colitis. Terminal ileum biopsy showed atypical lambda restricted clonal lymphoid proliferation consistent with low-grade lymphoproliferative disorder such as low-grade follicular lymphoma.  Addendum: Immunostain for H. pylori and Campylobacter is negative.    -1/4/2024 [facility], [place], operated by [facility] medical oncology hematology consult: Patient has felt vaguely poorly for the better part of the year.  Around September started having nausea and occasional vomiting and occasional diarrhea but mostly  just progressive fatigue despite no anemia.  Endoscopy in October and again in December showed ulcerations in the distal ileum without colitis but with lambda restricted clonal B-cell low-grade follicular lymphoma.  This is unlikely to be the immunoproliferative alpha heavy chain related MALT lymphoma but to be more likely the nonimmunoproliferative low-grade follicular lymphoma that may be associated with inflammatory bowel though the ileal biopsies do not confirm colitis nor did the CAT scan show any colitis.  PET scans are not generally extremely helpful with low-grade lymphomas but given that we do not have any other easily measurable disease I think it is reasonable to check a PET for that as well as to be sure there is no unexpectedly hot areas that may have discordant histology that may need more aggressive therapy if higher grade.  If the pet does light up vaguely, that would not distinguish inflammatory bowel from low-grade follicular B-cell lymphoma of the small bowel as they would both light up similarly.  I have communicated with Dr. Ortiz to get the ileum biopsy from December stain for H. pylori and Campylobacter jejuni and I will check her CBC, CMP, HIV, hep B, hep C, H. pylori breath test, and alpha heavy chains along with serum immunofixation electrophoresis.  At the end of the day, treatment will largely be guided by her symptoms which while nebulous are significant enough that I suspect we will end up giving her Rituxan in the next month or 2 pending results of these tests.  She understands the common dilemma with low-grade lymphomas of deciding when to treat as there is a very fine risk-benefit ratio balance.She has had frequent upper respiratory infections and I will check her quantitative immunoglobulins as well.    -1/4/2024 data:  CBC normal with white count 5580 hemoglobin 15.4 platelets 204,000 and normal differential.  Glucose 108 creatinine 1.02 GFR 58 chloride 110 alk phos 129 otherwise  unremarkable CMP.  Negative H. pylori breath test.  Normal serum immunoglobulin G/A/M kappa and lambda light chains with normal heavy to light chain ratio.  Normal quantitative immunoglobulins with no serum M spike.  Hepatitis C and B antibodies negative.  HIV 1 and 2 negative.   upper limit of normal 214.  Uric acid 5.9 upper limit of normal 5.7.  Sedimentation rate 19 normal.    -1/11/24 PET showed no hypermetabolism with normal-appearing bowel.    -1/18/2024 [facility] oncology telemedicine follow-up: I reviewed the above data with the patient.  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  She does have a capsule endoscopy for Tuesday of next week and I have left a message with Dr. 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  In order to know whether or not treatment is effective, she will need repeat endoscopy about 3 months out from treatment as the scans not surprisingly are entirely negative and I do not think we will have measurable disease to assess response either serologically or radiographically.  I have communicated this as well to Dr. Ortiz.  Presuming we proceed with his track I will see her the month out from her final weekly Rituxan. Odds of  high-grade lymphoma would seem unlikely with minimally elevated LDH and uric acid but I will ask my nurse  to give uric acid depleting allopurinol prescription.    - 1/25/2024 PillCam showed small nonbleeding angiodysplasias in the jejunum.  Ulcerated and edematous mucosa with luminal stenosis in the distal ileum.  No obvious mass or evidence of lymphoma.    -1/26/2024 chemotherapy preparation visit.    -1/29/2024 through 2/19/2024 Rituxan weekly x 4.  Normal weekly CBC through this.  Consultation with nutritionists    -2/19/2024 [facility], [place], operated by [facility] medical oncology follow-up: Today is her fourth of 4 weekly Rituxan treatments thus far with no complications.  CBC today is unremarkable.  Will get CMP but urinating well and no hint of fluid overload from renal dysfunction.  Capsule endoscopy only showed the lesion in the ileum which previously on biopsy showed the lymphoma.  No other disease on PET.  We will repeat CTs and endoscopy with Dr. Ortiz in 3 months.  Tolerated Rituxan without complication.  Still having diarrhea which predated the Rituxan.  She cannot work with this.    -5/20/2024 CT chest abdomen pelvis with contrast compared to January PET shows stable 10 mm left thyroid nodule.  No evidence of recurrent or residual lymphoma in the chest abdomen or pelvis.  CBC normal.    -5/20/2024 [facility] oncology hematology follow-up: No evidence of radiographic recurrence but, again, this was not visible radiographically to begin with but seen on endoscopy.  She has endoscopy with Dr. Ortiz next week.  Presuming he finds nothing sinister I will repeat her CTs and labs again in 4 months.  Still has about 7 loose stools a day which is about her baseline.    -5/29/2024 ileocolonoscopy Dr. Ortiz showed no ulcerations or colitis and mild diverticulosis.    -9/20/2024 CT chest abdomen pelvis with contrast showed no evidence of recurrence chest abdomen or pelvis.    -9/20/2024 [facility] oncology hematology follow-up:  Status post Rituxan first part of this year for management of small bowel lymphoma, has no evidence of lymphoma on ileocolonoscopy in May with Dr. Ortiz nor on CT today. CBC today entirely normal.  CMP , LDH, uric acid all pending today.  Will have her see my nurse practitioner back in 4 months with repeat CBC CMP uric acid and LDH prior to return and in the absence of new symptoms or palpable adenopathy I would do no further scans at this junction as her disease and the bowel has never been particularly visible with imaging and I would be unlikely to intervene with therapy in the absence of symptomatic progression.    -1/27/2025 CBC normal     Grade I follicular lymphoma of extranodal site excluding spleen and other solid organs   1/4/2024 Initial Diagnosis    Grade I follicular lymphoma of extranodal site excluding spleen and other solid organs     1/4/2024 Cancer Staged    Staging form: Hodgkin And Non-Hodgkin Lymphoma, AJCC 8th Edition  - Clinical stage from 1/4/2024: Stage IV (Follicular lymphoma) - Signed by En Carballo MD on 1/4/2024 1/29/2024 -  Chemotherapy    OP LYMPHOMA (CLL) RiTUXimab (Weekly X 4)         HISTORY OF PRESENT ILLNESS:  The patient is a 74 y.o. female, here for follow up on management of follicular low-grade lymphoma of the terminal ileum status post Rituxan x 4 ending February 2024.  Cata overall has been doing well since we saw her last.  She states her appetite is good.  She still has occasional loose stool sometimes with urgency.  She has some itching all over over the last few months but no rash.  No lymphadenopathy that she is aware of.  No abdominal pain.  Weight is stable.  She states that she has difficulty falling asleep.  She admits that since she has retired her sleep schedule has gotten a little off.  Chronic ongoing mild fatigue.    History reviewed. No pertinent past medical history.  Past Surgical History:   Procedure Laterality Date    OTHER SURGICAL HISTORY   "01/25/2024    Pill Cam       No Known Allergies    Family History and Social History reviewed and changed as necessary    REVIEW OF SYSTEM:   Intermittent loose stool  Difficulty falling asleep  Pruritus    PHYSICAL EXAM:  Well-developed, well-nourished appearing female in no distress, here with her  today  No palpable lymphadenopathy in the cervical, supraclavicular or axillary region  Respirations regular and unlabored  Abdomen soft nontender, nondistended  Skin without rash      Vitals:    01/27/25 1322   BP: 173/87   Pulse: 111   Resp: 18   Temp: 100.2 °F (37.9 °C)   SpO2: 96%   Weight: 74.4 kg (164 lb)   Height: 157.5 cm (62\")     Vitals:    01/27/25 1322   PainSc: 0-No pain  Comment: +some nausea today          ECOG score: 1           Vitals reviewed.  Labs reviewed      Lab Results   Component Value Date    HGB 14.7 01/27/2025    HCT 43.8 01/27/2025    MCV 97.3 (H) 01/27/2025     01/27/2025    WBC 5.05 01/27/2025    NEUTROABS 3.23 01/27/2025    LYMPHSABS 1.32 01/27/2025    MONOSABS 0.27 01/27/2025    EOSABS 0.19 01/27/2025    BASOSABS 0.04 01/27/2025       Lab Results   Component Value Date    GLUCOSE 120 (H) 01/27/2025    BUN 16 01/27/2025    CREATININE 0.92 01/27/2025     (H) 01/27/2025    K 4.0 01/27/2025     (H) 01/27/2025    CO2 27.0 01/27/2025    CALCIUM 9.6 01/27/2025    PROTEINTOT 6.5 01/27/2025    ALBUMIN 4.1 01/27/2025    BILITOT 0.9 01/27/2025    ALKPHOS 126 (H) 01/27/2025    AST 36 (H) 01/27/2025    ALT 31 01/27/2025             ASSESSMENT & PLAN:  1.  Follicular low-grade B-cell lymphoma terminal ileum with negative CT scan chest abdomen pelvis and PET.  No other obvious GI involvement on PillCam.  1/29/2024 through 2/19/2024 Rituxan weekly x 4.  In ED on CT chest abdomen pelvis April 2024  2.  Stage IIIa chronic kidney disease  3.  AVM without bleeding    Hematology oncology history timeline:  -9/23/2023 stool cultures negative for pathogens  -10/6/2023 EGD and " colonoscopy Dr. Ortiz.  Antral biopsies showed minimal chronic gastritis H. pylori negative.  Transverse colon has a serrated sessile polyp negative for dysplasia.  Ileum biopsy showed atypical lymphoid infiltrate with some monotony.  Extra departmental review by [facility] [place] [facility] consultant agreed with atypical lymphoid infiltrate that would support a low-grade follicular lymphoma but clinical correlation is essential per consultant.  Cells are positive for CD20 and less positive for CD3 and CD5.  Bcl-2 and CD10 strongly positive negative for cyclin D1.  -10/31/2023 CT chest abdomen pelvis with contrast with no worrisome findings for occult neoplasm.  Normal CBC and differential.  CMP unremarkable save for creatinine 1.17 GFR 49.7.  LDH mildly elevated 303 upper limit of normal 214.  -12/5/2023 colonoscopy 4 small ulcerationsin distal tumor ileum showed sigmoid polyp tubular adenoma. No colitis. Terminal ileum biopsy showed atypical lambda restricted clonal lymphoid proliferation consistent with low-grade lymphoproliferative disorder such as low-grade follicular lymphoma.  Addendum: Immunostain for H. pylori and Campylobacter is negative.    -1/4/2024 [facility] medical oncology hematology consult: Patient has felt vaguely poorly for the better part of the year.  Around September started having nausea and occasional vomiting and occasional diarrhea but mostly just progressive fatigue despite no anemia.  Endoscopy in October and again in December showed ulcerations in the distal ileum without colitis but with lambda restricted clonal B-cell low-grade follicular lymphoma.  This is unlikely to be the immunoproliferative alpha heavy chain related MALT lymphoma but to be more likely the nonimmunoproliferative low-grade follicular lymphoma that may be associated with inflammatory bowel though the ileal biopsies do not confirm colitis nor did the CAT scan show any colitis.  PET scans are not generally extremely  helpful with low-grade lymphomas but given that we do not have any other easily measurable disease I think it is reasonable to check a PET for that as well as to be sure there is no unexpectedly hot areas that may have discordant histology that may need more aggressive therapy if higher grade.  If the pet does light up vaguely, that would not distinguish inflammatory bowel from low-grade follicular B-cell lymphoma of the small bowel as they would both light up similarly.  I have communicated with Dr. Ortiz to get the ileum biopsy from December stain for H. pylori and Campylobacter jejuni and I will check her CBC, CMP, HIV, hep B, hep C, H. pylori breath test, and alpha heavy chains along with serum immunofixation electrophoresis.  At the end of the day, treatment will largely be guided by her symptoms which while nebulous are significant enough that I suspect we will end up giving her Rituxan in the next month or 2 pending results of these tests.  She understands the common dilemma with low-grade lymphomas of deciding when to treat as there is a very fine risk-benefit ratio balance.She has had frequent upper respiratory infections and I will check her quantitative immunoglobulins as well.    -1/4/2024 data:  CBC normal with white count 5580 hemoglobin 15.4 platelets 204,000 and normal differential.  Glucose 108 creatinine 1.02 GFR 58 chloride 110 alk phos 129 otherwise unremarkable CMP.  Negative H. pylori breath test.  Normal serum immunoglobulin G/A/M kappa and lambda light chains with normal heavy to light chain ratio.  Normal quantitative immunoglobulins with no serum M spike.  Hepatitis C and B antibodies negative.  HIV 1 and 2 negative.   upper limit of normal 214.  Uric acid 5.9 upper limit of normal 5.7.  Sedimentation rate 19 normal.    -1/11/24 PET showed no hypermetabolism with normal-appearing bowel.    -1/18/2024 [facility] oncology telemedicine follow-up: I reviewed the above data with the  patient.  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  She does have a capsule endoscopy for Tuesday of next week and I have left a message with Dr. 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  In order to know whether or not treatment is effective, she will need repeat endoscopy about 3 months out from treatment as the scans not surprisingly are entirely negative and I do not think we will have measurable disease to assess response either serologically or radiographically.  I have communicated this as well to Dr. Ortiz.  Presuming we proceed with his track I will see her the month out from her final weekly Rituxan. Odds of high-grade lymphoma would seem unlikely with minimally elevated LDH and uric acid but I will ask my nurse  to give uric acid depleting allopurinol prescription.    - 1/25/2024 PillCam showed small nonbleeding angiodysplasias in the jejunum.  Ulcerated and edematous mucosa with luminal stenosis in the distal ileum.  No obvious mass or evidence of lymphoma.    -1/26/2024 chemotherapy preparation visit.    -1/29/2024 through 2/19/2024 Rituxan weekly x 4.  Normal weekly CBC through this.  Consultation with nutritionists    -2/19/2024 [facility] medical oncology  follow-up: Today is her fourth of 4 weekly Rituxan treatments thus far with no complications.  CBC today is unremarkable.  Will get CMP but urinating well and no hint of fluid overload from renal dysfunction.  Capsule endoscopy only showed the lesion in the ileum which previously on biopsy showed the lymphoma.  No other disease on PET.  We will repeat CTs and endoscopy with Dr. Ortiz in 3 months.  Tolerated Rituxan without complication.  Still having diarrhea which predated the Rituxan.  She cannot work with this.    -5/20/2024 CT chest abdomen pelvis with contrast compared to January PET shows stable 10 mm left thyroid nodule.  No evidence of recurrent or residual lymphoma in the chest abdomen or pelvis.  CBC normal.    -5/20/2024 [facility] oncology hematology follow-up: No evidence of radiographic recurrence but, again, this was not visible radiographically to begin with but seen on endoscopy.  She has endoscopy with Dr. Ortiz next week.  Presuming he finds nothing sinister I will repeat her CTs and labs again in 4 months.  Still has about 7 loose stools a day which is about her baseline.    -5/29/2024 ileocolonoscopy Dr. Ortiz showed no ulcerations or colitis and mild diverticulosis.    -9/20/2024 CT chest abdomen pelvis with contrast showed no evidence of recurrence chest abdomen or pelvis.    -9/20/2024 [facility] oncology hematology follow-up: Status post Rituxan first part of this year for management of small bowel lymphoma, has no evidence of lymphoma on ileocolonoscopy in May with Dr. Ortiz nor on CT today. CBC today entirely normal.  CMP , LDH, uric acid all pending today.  Will have her see my nurse practitioner back in 4 months with repeat CBC CMP uric acid and LDH prior to return and in the absence of new symptoms or palpable adenopathy I would do no further scans at this junction as her disease and the bowel has never been particularly visible with imaging and I would be unlikely to  intervene with therapy in the absence of symptomatic progression.    -1/27/2025 CBC normal, CMP glucose 120, BUN 16, creatinine 0.92, sodium 146, ALT 31, AST stable at 36 with alkaline phosphatase 126, total bilirubin 0.9.  Uric acid 5.3,  (4 months ago 238).  -1/27/2025 [facility] hematology/oncology clinic follow-up: Cata is doing well.  Labs as shown above are stable.  She has no new symptoms that are worrisome.  Bowel habits seem to be the same with occasional loose stool.  She is eating well and her weight is stable.  She has some mild pruritus but this seems to coincide with the winter months and they do use wood-burning stove.  She is going to look into a humidifier.  If it does not improve or worsens she will let us know.  She has no rash.  In regards to her insomnia, I have recommended she try something like melatonin or Tylenol PM to help her fall asleep.  She will discuss further with Dr. Collado her PCP.  We also discussed good sleep hygiene and I think her sleep schedule is a little bit off since she retired.  We will plan on seeing her back in 6 months for follow-up with repeat CBC, CMP and LDH on return and I have ordered those today.  She understands to notify us in the interim if she has any concerns such as changes in her GI system, weight loss abdominal pain etc.    I spent 31 minutes caring for Cata on this date of service. This time includes time spent by me in the following activities: preparing for the visit, reviewing tests, performing a medically appropriate examination and/or evaluation, counseling and educating the patient/family/caregiver, ordering medications, tests, or procedures, documenting information in the medical record, and independently interpreting results and communicating that information with the patient/family/caregiver.     Chasity Avilez, APRN    01/27/2025

## 2025-03-26 ENCOUNTER — TELEPHONE (OUTPATIENT)
Dept: ONCOLOGY | Facility: CLINIC | Age: 75
End: 2025-03-26
Payer: MEDICARE

## 2025-03-26 NOTE — TELEPHONE ENCOUNTER
----- Message from Chasity Avilez sent at 3/26/2025  1:42 PM EDT -----  Regarding: RE: Disability  Not that I am aware of.  ----- Message -----  From: Anamaria Brito MA  Sent: 3/26/2025   1:37 PM EDT  To: MURIEL Mena  Subject: Disability                                       Wanted to make sure I am not missing something. I see where PT completed [place]. From an Oncology standpoint, is the PT disabled?Thanks,   ~Shell

## 2025-03-26 NOTE — TELEPHONE ENCOUNTER
PT notified and verbalized understanding that from an Oncology standpoint she is not disabled. PT requested disability forms to be returned to her. Mailed [employer]PS.  [id number]  ~Shell

## 2025-03-27 ENCOUNTER — TELEPHONE (OUTPATIENT)
Dept: ONCOLOGY | Facility: CLINIC | Age: 75
End: 2025-03-27
Payer: MEDICARE

## 2025-03-27 NOTE — TELEPHONE ENCOUNTER
Caller: Cata Hidalgo    Relationship: Self    Best call back number: [phone]       Who are you requesting to speak with (clinical staff, provider,  specific staff member): CLINICAL    What was the call regarding: PATIENT CALLING TO SCHEDULE SOONER THAN 6 MO FU.    PATIENT HAS DISABILITY PAPERWORK TO COMPLETE    PATIENT ALSO HAVING SYMPTOMS OF DIARRHEA DAILY, FATIGUE,.

## 2025-03-27 NOTE — TELEPHONE ENCOUNTER
Called patient back, she states that she has diarrhea and occasional incontinence that started when she was diagnosed with lymphoma. Discussed with MURIEL Gaspar and she feels this needs to be discussed with Dr. Carballo upon his return. Called patient and she verbalized understanding.

## 2025-03-31 NOTE — TELEPHONE ENCOUNTER
Discussed with Dr. Carballo and he states that if patient is having diarrhea to the degree that she is incontinent at times he would recommend that she follow up with Dr. Ortiz to have scopes done. Patient notified and verbalized understanding and agreement with this.

## 2025-07-21 ENCOUNTER — OFFICE VISIT (OUTPATIENT)
Dept: ONCOLOGY | Facility: CLINIC | Age: 75
End: 2025-07-21
Payer: MEDICARE

## 2025-07-21 ENCOUNTER — LAB (OUTPATIENT)
Dept: LAB | Facility: HOSPITAL | Age: 75
End: 2025-07-21
Payer: MEDICARE

## 2025-07-21 VITALS
HEART RATE: 79 BPM | SYSTOLIC BLOOD PRESSURE: 152 MMHG | OXYGEN SATURATION: 95 % | DIASTOLIC BLOOD PRESSURE: 73 MMHG | TEMPERATURE: 98.3 F | WEIGHT: 163 LBS | HEIGHT: 62 IN | BODY MASS INDEX: 30 KG/M2 | RESPIRATION RATE: 18 BRPM

## 2025-07-21 DIAGNOSIS — C82.09 GRADE I FOLLICULAR LYMPHOMA OF EXTRANODAL SITE EXCLUDING SPLEEN AND OTHER SOLID ORGANS: ICD-10-CM

## 2025-07-21 DIAGNOSIS — C82.09 GRADE I FOLLICULAR LYMPHOMA OF EXTRANODAL SITE EXCLUDING SPLEEN AND OTHER SOLID ORGANS: Primary | ICD-10-CM

## 2025-07-21 LAB
ALBUMIN SERPL-MCNC: 4.2 G/DL (ref 3.5–5.2)
ALBUMIN/GLOB SERPL: 1.7 G/DL
ALP SERPL-CCNC: 115 U/L (ref 39–117)
ALT SERPL W P-5'-P-CCNC: 25 U/L (ref 1–33)
ANION GAP SERPL CALCULATED.3IONS-SCNC: 8.8 MMOL/L (ref 5–15)
AST SERPL-CCNC: 31 U/L (ref 1–32)
BASOPHILS # BLD AUTO: 0.04 10*3/MM3 (ref 0–0.2)
BASOPHILS NFR BLD AUTO: 0.8 % (ref 0–1.5)
BILIRUB SERPL-MCNC: 0.6 MG/DL (ref 0–1.2)
BUN SERPL-MCNC: 11.4 MG/DL (ref 8–23)
BUN/CREAT SERPL: 12.8 (ref 7–25)
CALCIUM SPEC-SCNC: 9.3 MG/DL (ref 8.6–10.5)
CHLORIDE SERPL-SCNC: 108 MMOL/L (ref 98–107)
CO2 SERPL-SCNC: 25.2 MMOL/L (ref 22–29)
CREAT SERPL-MCNC: 0.89 MG/DL (ref 0.57–1)
DEPRECATED RDW RBC AUTO: 43.7 FL (ref 37–54)
EGFRCR SERPLBLD CKD-EPI 2021: 68.1 ML/MIN/1.73
EOSINOPHIL # BLD AUTO: 0.09 10*3/MM3 (ref 0–0.4)
EOSINOPHIL NFR BLD AUTO: 1.7 % (ref 0.3–6.2)
ERYTHROCYTE [DISTWIDTH] IN BLOOD BY AUTOMATED COUNT: 11.9 % (ref 12.3–15.4)
GLOBULIN UR ELPH-MCNC: 2.5 GM/DL
GLUCOSE SERPL-MCNC: 103 MG/DL (ref 65–99)
HCT VFR BLD AUTO: 46.3 % (ref 34–46.6)
HGB BLD-MCNC: 15.2 G/DL (ref 12–15.9)
IMM GRANULOCYTES # BLD AUTO: 0.01 10*3/MM3 (ref 0–0.05)
IMM GRANULOCYTES NFR BLD AUTO: 0.2 % (ref 0–0.5)
LDH SERPL-CCNC: 229 U/L (ref 135–214)
LYMPHOCYTES # BLD AUTO: 1.57 10*3/MM3 (ref 0.7–3.1)
LYMPHOCYTES NFR BLD AUTO: 30.3 % (ref 19.6–45.3)
MCH RBC QN AUTO: 32.3 PG (ref 26.6–33)
MCHC RBC AUTO-ENTMCNC: 32.8 G/DL (ref 31.5–35.7)
MCV RBC AUTO: 98.3 FL (ref 79–97)
MONOCYTES # BLD AUTO: 0.31 10*3/MM3 (ref 0.1–0.9)
MONOCYTES NFR BLD AUTO: 6 % (ref 5–12)
NEUTROPHILS NFR BLD AUTO: 3.16 10*3/MM3 (ref 1.7–7)
NEUTROPHILS NFR BLD AUTO: 61 % (ref 42.7–76)
PLATELET # BLD AUTO: 161 10*3/MM3 (ref 140–450)
PMV BLD AUTO: 10.5 FL (ref 6–12)
POTASSIUM SERPL-SCNC: 4.7 MMOL/L (ref 3.5–5.2)
PROT SERPL-MCNC: 6.7 G/DL (ref 6–8.5)
RBC # BLD AUTO: 4.71 10*6/MM3 (ref 3.77–5.28)
SODIUM SERPL-SCNC: 142 MMOL/L (ref 136–145)
WBC NRBC COR # BLD AUTO: 5.18 10*3/MM3 (ref 3.4–10.8)

## 2025-07-21 PROCEDURE — 1160F RVW MEDS BY RX/DR IN RCRD: CPT | Performed by: INTERNAL MEDICINE

## 2025-07-21 PROCEDURE — 83615 LACTATE (LD) (LDH) ENZYME: CPT

## 2025-07-21 PROCEDURE — 99214 OFFICE O/P EST MOD 30 MIN: CPT | Performed by: INTERNAL MEDICINE

## 2025-07-21 PROCEDURE — 36415 COLL VENOUS BLD VENIPUNCTURE: CPT

## 2025-07-21 PROCEDURE — 1126F AMNT PAIN NOTED NONE PRSNT: CPT | Performed by: INTERNAL MEDICINE

## 2025-07-21 PROCEDURE — 1159F MED LIST DOCD IN RCRD: CPT | Performed by: INTERNAL MEDICINE

## 2025-07-21 PROCEDURE — 80053 COMPREHEN METABOLIC PANEL: CPT

## 2025-07-21 PROCEDURE — 85025 COMPLETE CBC W/AUTO DIFF WBC: CPT

## 2025-07-21 RX ORDER — TRAZODONE HYDROCHLORIDE 150 MG/1
150 TABLET ORAL EVERY EVENING
COMMUNITY
Start: 2025-06-25

## 2025-07-21 RX ORDER — CHOLESTYRAMINE 4 G/9G
2 POWDER, FOR SUSPENSION ORAL
COMMUNITY
Start: 2025-06-25

## 2025-07-21 RX ORDER — GABAPENTIN 100 MG/1
1 CAPSULE ORAL EVERY 12 HOURS SCHEDULED
COMMUNITY
Start: 2025-06-25

## 2025-07-21 RX ORDER — ONDANSETRON 4 MG/1
4 TABLET, FILM COATED ORAL EVERY 8 HOURS PRN
COMMUNITY
Start: 2025-06-24

## 2025-07-21 NOTE — PROGRESS NOTES
CHIEF COMPLAINT: Occasional abdominal pains and 2-3 loose stools per day    Problem List:  Oncology/Hematology History Overview Note   1.  Follicular low-grade B-cell lymphoma terminal ileum with negative CT scan chest abdomen pelvis and PET.  No other obvious GI involvement on PillCam.  1/29/2024 through 2/19/2024 Rituxan weekly x 4.  In ED on CT chest abdomen pelvis April 2024  2.  Stage IIIa chronic kidney disease  3.  AVM without bleeding    Hematology oncology history timeline:  -9/23/2023 stool cultures negative for pathogens  -10/6/2023 EGD and colonoscopy Dr. Ortiz.  Antral biopsies showed minimal chronic gastritis H. pylori negative.  Transverse colon has a serrated sessile polyp negative for dysplasia.  Ileum biopsy showed atypical lymphoid infiltrate with some monotony.  Extra departmental review by [facility] [place] [facility] consultant agreed with atypical lymphoid infiltrate that would support a low-grade follicular lymphoma but clinical correlation is essential per consultant.  Cells are positive for CD20 and less positive for CD3 and CD5.  Bcl-2 and CD10 strongly positive negative for cyclin D1.  -10/31/2023 CT chest abdomen pelvis with contrast with no worrisome findings for occult neoplasm.  Normal CBC and differential.  CMP unremarkable save for creatinine 1.17 GFR 49.7.  LDH mildly elevated 303 upper limit of normal 214.  -12/5/2023 colonoscopy 4 small ulcerationsin distal tumor ileum showed sigmoid polyp tubular adenoma. No colitis. Terminal ileum biopsy showed atypical lambda restricted clonal lymphoid proliferation consistent with low-grade lymphoproliferative disorder such as low-grade follicular lymphoma.  Addendum: Immunostain for H. pylori and Campylobacter is negative.    -1/4/2024 [facility] medical oncology hematology consult: Patient has felt vaguely poorly for the better part of the year.  Around September started having nausea and occasional vomiting and occasional diarrhea but mostly  just progressive fatigue despite no anemia.  Endoscopy in October and again in December showed ulcerations in the distal ileum without colitis but with lambda restricted clonal B-cell low-grade follicular lymphoma.  This is unlikely to be the immunoproliferative alpha heavy chain related MALT lymphoma but to be more likely the nonimmunoproliferative low-grade follicular lymphoma that may be associated with inflammatory bowel though the ileal biopsies do not confirm colitis nor did the CAT scan show any colitis.  PET scans are not generally extremely helpful with low-grade lymphomas but given that we do not have any other easily measurable disease I think it is reasonable to check a PET for that as well as to be sure there is no unexpectedly hot areas that may have discordant histology that may need more aggressive therapy if higher grade.  If the pet does light up vaguely, that would not distinguish inflammatory bowel from low-grade follicular B-cell lymphoma of the small bowel as they would both light up similarly.  I have communicated with Dr. Ortiz to get the ileum biopsy from December stain for H. pylori and Campylobacter jejuni and I will check her CBC, CMP, HIV, hep B, hep C, H. pylori breath test, and alpha heavy chains along with serum immunofixation electrophoresis.  At the end of the day, treatment will largely be guided by her symptoms which while nebulous are significant enough that I suspect we will end up giving her Rituxan in the next month or 2 pending results of these tests.  She understands the common dilemma with low-grade lymphomas of deciding when to treat as there is a very fine risk-benefit ratio balance.She has had frequent upper respiratory infections and I will check her quantitative immunoglobulins as well.    -1/4/2024 data:  CBC normal with white count 5580 hemoglobin 15.4 platelets 204,000 and normal differential.  Glucose 108 creatinine 1.02 GFR 58 chloride 110 alk phos 129 otherwise  unremarkable CMP.  Negative H. pylori breath test.  Normal serum immunoglobulin G/A/M kappa and lambda light chains with normal heavy to light chain ratio.  Normal quantitative immunoglobulins with no serum M spike.  Hepatitis C and B antibodies negative.  HIV 1 and 2 negative.   upper limit of normal 214.  Uric acid 5.9 upper limit of normal 5.7.  Sedimentation rate 19 normal.    -1/11/24 PET showed no hypermetabolism with normal-appearing bowel.    -1/18/2024 [facility] oncology telemedicine follow-up: I reviewed the above data with the patient.  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  She does have a capsule endoscopy for Tuesday of next week and I have left a message with Dr. 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  In order to know whether or not treatment is effective, she will need repeat endoscopy about 3 months out from treatment as the scans not surprisingly are entirely negative and I do not think we will have measurable disease to assess response either serologically or radiographically.  I have communicated this as well to Dr. Ortiz.  Presuming we proceed with his track I will see her the month out from her final weekly Rituxan. Odds of  high-grade lymphoma would seem unlikely with minimally elevated LDH and uric acid but I will ask my nurse  to give uric acid depleting allopurinol prescription.    - 1/25/2024 PillCam showed small nonbleeding angiodysplasias in the jejunum.  Ulcerated and edematous mucosa with luminal stenosis in the distal ileum.  No obvious mass or evidence of lymphoma.    -1/26/2024 chemotherapy preparation visit.    -1/29/2024 through 2/19/2024 Rituxan weekly x 4.  Normal weekly CBC through this.  Consultation with nutritionists    -2/19/2024 [facility] medical oncology follow-up: Today is her fourth of 4 weekly Rituxan treatments thus far with no complications.  CBC today is unremarkable.  Will get CMP but urinating well and no hint of fluid overload from renal dysfunction.  Capsule endoscopy only showed the lesion in the ileum which previously on biopsy showed the lymphoma.  No other disease on PET.  We will repeat CTs and endoscopy with Dr. Ortiz in 3 months.  Tolerated Rituxan without complication.  Still having diarrhea which predated the Rituxan.  She cannot work with this.    -5/20/2024 CT chest abdomen pelvis with contrast compared to January PET shows stable 10 mm left thyroid nodule.  No evidence of recurrent or residual lymphoma in the chest abdomen or pelvis.  CBC normal.    -5/20/2024 [facility] oncology hematology follow-up: No evidence of radiographic recurrence but, again, this was not visible radiographically to begin with but seen on endoscopy.  She has endoscopy with Dr. Ortiz next week.  Presuming he finds nothing sinister I will repeat her CTs and labs again in 4 months.  Still has about 7 loose stools a day which is about her baseline.    -5/29/2024 ileocolonoscopy Dr. Ortzi showed no ulcerations or colitis and mild diverticulosis.    -9/20/2024 CT chest abdomen pelvis with contrast showed no evidence of recurrence chest abdomen or pelvis.    -9/20/2024 [facility] oncology hematology follow-up:  Status post Rituxan first part of this year for management of small bowel lymphoma, has no evidence of lymphoma on ileocolonoscopy in May with Dr. Ortiz nor on CT today. CBC today entirely normal.  CMP , LDH, uric acid all pending today.  Will have her see my nurse practitioner back in 4 months with repeat CBC CMP uric acid and LDH prior to return and in the absence of new symptoms or palpable adenopathy I would do no further scans at this junction as her disease and the bowel has never been particularly visible with imaging and I would be unlikely to intervene with therapy in the absence of symptomatic progression.    -1/27/2025 CBC normal, CMP glucose 120, BUN 16, creatinine 0.92, sodium 146, ALT 31, AST stable at 36 with alkaline phosphatase 126, total bilirubin 0.9.  Uric acid 5.3,  (4 months ago 238).  -1/27/2025 [facility] hematology/oncology clinic follow-up: Cata is doing well.  Labs as shown above are stable.  She has no new symptoms that are worrisome.  Bowel habits seem to be the same with occasional loose stool.  She is eating well and her weight is stable.  She has some mild pruritus but this seems to coincide with the winter months and they do use wood-burning stove.  She is going to look into a humidifier.  If it does not improve or worsens she will let us know.  She has no rash.  In regards to her insomnia, I have recommended she try something like melatonin or Tylenol PM to help her fall asleep.  She will discuss further with Dr. Collado her PCP.  We also discussed good sleep hygiene and I think her sleep schedule is a little bit off since she retired.  We will plan on seeing her back in 6 months for follow-up with repeat CBC, CMP and LDH on return and I have ordered those today.  She understands to notify us in the interim if she has any concerns such as changes in her GI system, weight loss abdominal pain etc.    - 7/21/2025 [facility] hematology oncology follow-up: That she still has 2-3 loose  "stools a day and occasional abdominal pains overall feeling fairly fit and does not want repeat endoscopy.  Blood counts today unremarkable CMP and LDH pending.  Will see us again in 6 months.  Scans if symptoms dictate but primarily repeat endoscopy if symptoms arise.     Grade I follicular lymphoma of extranodal site excluding spleen and other solid organs   1/4/2024 Initial Diagnosis    Grade I follicular lymphoma of extranodal site excluding spleen and other solid organs     1/4/2024 Cancer Staged    Staging form: Hodgkin And Non-Hodgkin Lymphoma, AJCC 8th Edition  - Clinical stage from 1/4/2024: Stage IV (Follicular lymphoma) - Signed by En Carballo MD on 1/4/2024 1/29/2024 -  Chemotherapy    OP LYMPHOMA (CLL) RiTUXimab (Weekly X 4)         HISTORY OF PRESENT ILLNESS:  The patient is a 74 y.o. female, here for follow up on management of low-grade follicular lymphoma of terminal ileum status post Rituxan in early 2024    History reviewed. No pertinent past medical history.  Past Surgical History:   Procedure Laterality Date    OTHER SURGICAL HISTORY  01/25/2024    Pill Cam       No Known Allergies    Family History and Social History reviewed and changed as necessary    REVIEW OF SYSTEM:   No other somatic complaints or B symptoms    PHYSICAL EXAM:  No cervical axillary or inguinal adenopathy.  No palpable hepatosplenomegaly.  No rashes.  A few spider angiomas of the upper torso.    Vitals:    07/21/25 1402   BP: 152/73   Pulse: 79   Resp: 18   Temp: 98.3 °F (36.8 °C)   SpO2: 95%   Weight: 73.9 kg (163 lb)   Height: 157.5 cm (62\")     Vitals:    07/21/25 1402   PainSc: 0-No pain          ECOG score: 1           Vitals reviewed.    ECOG: (1) Restricted in Physically Strenuous Activity, Ambulatory & Able to Do Work of Light Nature    Lab Results   Component Value Date    HGB 15.2 07/21/2025    HCT 46.3 07/21/2025    MCV 98.3 (H) 07/21/2025     07/21/2025    WBC 5.18 07/21/2025    NEUTROABS 3.16 " 07/21/2025    LYMPHSABS 1.57 07/21/2025    MONOSABS 0.31 07/21/2025    EOSABS 0.09 07/21/2025    BASOSABS 0.04 07/21/2025       Lab Results   Component Value Date    GLUCOSE 120 (H) 01/27/2025    BUN 16 01/27/2025    CREATININE 0.92 01/27/2025     (H) 01/27/2025    K 4.0 01/27/2025     (H) 01/27/2025    CO2 27.0 01/27/2025    CALCIUM 9.6 01/27/2025    PROTEINTOT 6.5 01/27/2025    ALBUMIN 4.1 01/27/2025    BILITOT 0.9 01/27/2025    ALKPHOS 126 (H) 01/27/2025    AST 36 (H) 01/27/2025    ALT 31 01/27/2025             ASSESSMENT & PLAN:  1.  Follicular low-grade B-cell lymphoma terminal ileum with negative CT scan chest abdomen pelvis and PET.  No other obvious GI involvement on PillCam.  1/29/2024 through 2/19/2024 Rituxan weekly x 4.  In ED on CT chest abdomen pelvis April 2024  2.  Stage IIIa chronic kidney disease  3.  AVM without bleeding    Hematology oncology history timeline:  -9/23/2023 stool cultures negative for pathogens  -10/6/2023 EGD and colonoscopy Dr. Ortiz.  Antral biopsies showed minimal chronic gastritis H. pylori negative.  Transverse colon has a serrated sessile polyp negative for dysplasia.  Ileum biopsy showed atypical lymphoid infiltrate with some monotony.  Extra departmental review by [facility] [place] [facility] consultant agreed with atypical lymphoid infiltrate that would support a low-grade follicular lymphoma but clinical correlation is essential per consultant.  Cells are positive for CD20 and less positive for CD3 and CD5.  Bcl-2 and CD10 strongly positive negative for cyclin D1.  -10/31/2023 CT chest abdomen pelvis with contrast with no worrisome findings for occult neoplasm.  Normal CBC and differential.  CMP unremarkable save for creatinine 1.17 GFR 49.7.  LDH mildly elevated 303 upper limit of normal 214.  -12/5/2023 colonoscopy 4 small ulcerationsin distal tumor ileum showed sigmoid polyp tubular adenoma. No colitis. Terminal ileum biopsy showed atypical lambda restricted  clonal lymphoid proliferation consistent with low-grade lymphoproliferative disorder such as low-grade follicular lymphoma.  Addendum: Immunostain for H. pylori and Campylobacter is negative.    -1/4/2024 [facility] oncology hematology consult: Patient has felt vaguely poorly for the better part of the year.  Around September started having nausea and occasional vomiting and occasional diarrhea but mostly just progressive fatigue despite no anemia.  Endoscopy in October and again in December showed ulcerations in the distal ileum without colitis but with lambda restricted clonal B-cell low-grade follicular lymphoma.  This is unlikely to be the immunoproliferative alpha heavy chain related MALT lymphoma but to be more likely the nonimmunoproliferative low-grade follicular lymphoma that may be associated with inflammatory bowel though the ileal biopsies do not confirm colitis nor did the CAT scan show any colitis.  PET scans are not generally extremely helpful with low-grade lymphomas but given that we do not have any other easily measurable disease I think it is reasonable to check a PET for that as well as to be sure there is no unexpectedly hot areas that may have discordant histology that may need more aggressive therapy if higher grade.  If the pet does light up vaguely, that would not distinguish inflammatory bowel from low-grade follicular B-cell lymphoma of the small bowel as they would both light up similarly.  I have communicated with Dr. Ortiz to get the ileum biopsy from December stain for H. pylori and Campylobacter jejuni and I will check her CBC, CMP, HIV, hep B, hep C, H. pylori breath test, and alpha heavy chains along with serum immunofixation electrophoresis.  At the end of the day, treatment will largely be guided by her symptoms which while nebulous are significant enough that I suspect we will end up giving her Rituxan in the next month or 2 pending results of these tests.  She understands  the common dilemma with low-grade lymphomas of deciding when to treat as there is a very fine risk-benefit ratio balance.She has had frequent upper respiratory infections and I will check her quantitative immunoglobulins as well.    -1/4/2024 data:  CBC normal with white count 5580 hemoglobin 15.4 platelets 204,000 and normal differential.  Glucose 108 creatinine 1.02 GFR 58 chloride 110 alk phos 129 otherwise unremarkable CMP.  Negative H. pylori breath test.  Normal serum immunoglobulin G/A/M kappa and lambda light chains with normal heavy to light chain ratio.  Normal quantitative immunoglobulins with no serum M spike.  Hepatitis C and B antibodies negative.  HIV 1 and 2 negative.   upper limit of normal 214.  Uric acid 5.9 upper limit of normal 5.7.  Sedimentation rate 19 normal.    -1/11/24 PET showed no hypermetabolism with normal-appearing bowel.    -1/18/2024 [facility] oncology telemedicine follow-up: I reviewed the above data with the patient.  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  She does have a capsule endoscopy for Tuesday of next week and I have left a message with Dr. 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  In order to know whether or not  treatment is effective, she will need repeat endoscopy about 3 months out from treatment as the scans not surprisingly are entirely negative and I do not think we will have measurable disease to assess response either serologically or radiographically.  I have communicated this as well to Dr. Ortiz.  Presuming we proceed with his track I will see her the month out from her final weekly Rituxan. Odds of high-grade lymphoma would seem unlikely with minimally elevated LDH and uric acid but I will ask my nurse  to give uric acid depleting allopurinol prescription.    - 1/25/2024 PillCam showed small nonbleeding angiodysplasias in the jejunum.  Ulcerated and edematous mucosa with luminal stenosis in the distal ileum.  No obvious mass or evidence of lymphoma.    -1/26/2024 chemotherapy preparation visit.    -1/29/2024 through 2/19/2024 Rituxan weekly x 4.  Normal weekly CBC through this.  Consultation with nutritionists    -2/19/2024 [facility] medical oncology follow-up: Today is her fourth of 4 weekly Rituxan treatments thus far with no complications.  CBC today is unremarkable.  Will get CMP but urinating well and no hint of fluid overload from renal dysfunction.  Capsule endoscopy only showed the lesion in the ileum which previously on biopsy showed the lymphoma.  No other disease on PET.  We will repeat CTs and endoscopy with Dr. Ortiz in 3 months.  Tolerated Rituxan without complication.  Still having diarrhea which predated the Rituxan.  She cannot work with this.    -5/20/2024 CT chest abdomen pelvis with contrast compared to January PET shows stable 10 mm left thyroid nodule.  No evidence of recurrent or residual lymphoma in the chest abdomen or pelvis.  CBC normal.    -5/20/2024 [facility] medical oncology hematology follow-up: No evidence of radiographic recurrence but, again, this was not visible radiographically to begin with but seen on endoscopy.  She has endoscopy with Dr. Ortiz next week.  Presuming he finds  nothing sinister I will repeat her CTs and labs again in 4 months.  Still has about 7 loose stools a day which is about her baseline.    -5/29/2024 ileocolonoscopy Dr. Ortiz showed no ulcerations or colitis and mild diverticulosis.    -9/20/2024 CT chest abdomen pelvis with contrast showed no evidence of recurrence chest abdomen or pelvis.    -9/20/2024 [facility] medical oncology hematology follow-up: Status post Rituxan first part of this year for management of small bowel lymphoma, has no evidence of lymphoma on ileocolonoscopy in May with Dr. Ortiz nor on CT today. CBC today entirely normal.  CMP , LDH, uric acid all pending today.  Will have her see my nurse practitioner back in 4 months with repeat CBC CMP uric acid and LDH prior to return and in the absence of new symptoms or palpable adenopathy I would do no further scans at this junction as her disease and the bowel has never been particularly visible with imaging and I would be unlikely to intervene with therapy in the absence of symptomatic progression.    -1/27/2025 CBC normal, CMP glucose 120, BUN 16, creatinine 0.92, sodium 146, ALT 31, AST stable at 36 with alkaline phosphatase 126, total bilirubin 0.9.  Uric acid 5.3,  (4 months ago 238).  -1/27/2025 [facility] hematology/oncology clinic follow-up: Cata is doing well.  Labs as shown above are stable.  She has no new symptoms that are worrisome.  Bowel habits seem to be the same with occasional loose stool.  She is eating well and her weight is stable.  She has some mild pruritus but this seems to coincide with the winter months and they do use wood-burning stove.  She is going to look into a humidifier.  If it does not improve or worsens she will let us know.  She has no rash.  In regards to her insomnia, I have recommended she try something like melatonin or Tylenol PM to help her fall asleep.  She will discuss further with Dr. Collado her PCP.  We also discussed good sleep hygiene and I think  her sleep schedule is a little bit off since she retired.  We will plan on seeing her back in 6 months for follow-up with repeat CBC, CMP and LDH on return and I have ordered those today.  She understands to notify us in the interim if she has any concerns such as changes in her GI system, weight loss abdominal pain etc.    - 7/21/2025 [facility] hematology oncology follow-up: That she still has 2-3 loose stools a day and occasional abdominal pains overall feeling fairly fit and does not want repeat endoscopy.  Blood counts today unremarkable CMP and LDH pending.  Will see us again in 6 months.  Scans if symptoms dictate but primarily repeat endoscopy if symptoms arise.    Total time of care today inclusive of time spent today prior to her arrival reviewing interval data from labs today and during visit interviewing her as to signs or symptoms of her disease and management thereof and after visit instituting this plan took 35 minutes patient care time throughout the day today.  En Carballo MD    07/21/2025

## 2025-07-21 NOTE — LETTER
July 21, 2025     Santiago Collado DO  [address]  [address]  [facility] [address]    Patient: Cata Hidalgo   YOB: 1950   Date of Visit: 7/21/2025     Dear Santiago Collado DO:       Thank you for referring Cata Hidalgo to me for evaluation. Below are the relevant portions of my assessment and plan of care.    If you have questions, please do not hesitate to call me. I look forward to following Cata along with you.         Sincerely,        En Carballo MD        CC: MD Haris Saunders Lee G, MD  07/21/25 1427  Sign when Signing Visit  CHIEF COMPLAINT: Occasional abdominal pains and 2-3 loose stools per day    Problem List:  Oncology/Hematology History Overview Note   1.  Follicular low-grade B-cell lymphoma terminal ileum with negative CT scan chest abdomen pelvis and PET.  No other obvious GI involvement on PillCam.  1/29/2024 through 2/19/2024 Rituxan weekly x 4.  In ED on CT chest abdomen pelvis April 2024  2.  Stage IIIa chronic kidney disease  3.  AVM without bleeding    Hematology oncology history timeline:  -9/23/2023 stool cultures negative for pathogens  -10/6/2023 EGD and colonoscopy Dr. Ortiz.  Antral biopsies showed minimal chronic gastritis H. pylori negative.  Transverse colon has a serrated sessile polyp negative for dysplasia.  Ileum biopsy showed atypical lymphoid infiltrate with some monotony.  Extra departmental review by [facility] [place] [facility] consultant agreed with atypical lymphoid infiltrate that would support a low-grade follicular lymphoma but clinical correlation is essential per consultant.  Cells are positive for CD20 and less positive for CD3 and CD5.  Bcl-2 and CD10 strongly positive negative for cyclin D1.  -10/31/2023 CT chest abdomen pelvis with contrast with no worrisome findings for occult neoplasm.  Normal CBC and differential.  CMP unremarkable save for creatinine 1.17 GFR 49.7.  LDH mildly elevated 303 upper limit of  normal 214.  -12/5/2023 colonoscopy 4 small ulcerationsin distal tumor ileum showed sigmoid polyp tubular adenoma. No colitis. Terminal ileum biopsy showed atypical lambda restricted clonal lymphoid proliferation consistent with low-grade lymphoproliferative disorder such as low-grade follicular lymphoma.  Addendum: Immunostain for H. pylori and Campylobacter is negative.    -1/4/2024 [facility] oncology hematology consult: Patient has felt vaguely poorly for the better part of the year.  Around September started having nausea and occasional vomiting and occasional diarrhea but mostly just progressive fatigue despite no anemia.  Endoscopy in October and again in December showed ulcerations in the distal ileum without colitis but with lambda restricted clonal B-cell low-grade follicular lymphoma.  This is unlikely to be the immunoproliferative alpha heavy chain related MALT lymphoma but to be more likely the nonimmunoproliferative low-grade follicular lymphoma that may be associated with inflammatory bowel though the ileal biopsies do not confirm colitis nor did the CAT scan show any colitis.  PET scans are not generally extremely helpful with low-grade lymphomas but given that we do not have any other easily measurable disease I think it is reasonable to check a PET for that as well as to be sure there is no unexpectedly hot areas that may have discordant histology that may need more aggressive therapy if higher grade.  If the pet does light up vaguely, that would not distinguish inflammatory bowel from low-grade follicular B-cell lymphoma of the small bowel as they would both light up similarly.  I have communicated with Dr. Ortiz to get the ileum biopsy from December stain for H. pylori and Campylobacter jejuni and I will check her CBC, CMP, HIV, hep B, hep C, H. pylori breath test, and alpha heavy chains along with serum immunofixation electrophoresis.  At the end of the day, treatment will largely be  guided by her symptoms which while nebulous are significant enough that I suspect we will end up giving her Rituxan in the next month or 2 pending results of these tests.  She understands the common dilemma with low-grade lymphomas of deciding when to treat as there is a very fine risk-benefit ratio balance.She has had frequent upper respiratory infections and I will check her quantitative immunoglobulins as well.    -1/4/2024 data:  CBC normal with white count 5580 hemoglobin 15.4 platelets 204,000 and normal differential.  Glucose 108 creatinine 1.02 GFR 58 chloride 110 alk phos 129 otherwise unremarkable CMP.  Negative H. pylori breath test.  Normal serum immunoglobulin G/A/M kappa and lambda light chains with normal heavy to light chain ratio.  Normal quantitative immunoglobulins with no serum M spike.  Hepatitis C and B antibodies negative.  HIV 1 and 2 negative.   upper limit of normal 214.  Uric acid 5.9 upper limit of normal 5.7.  Sedimentation rate 19 normal.    -1/11/24 PET showed no hypermetabolism with normal-appearing bowel.    -1/18/2024 [facility] oncology telemedicine follow-up: I reviewed the above data with the patient.  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  She does have a capsule endoscopy for Tuesday of next week and I have left a message with Dr. 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  In order to know whether or not treatment is effective, she will need repeat endoscopy about 3 months out from treatment as the scans not surprisingly are entirely negative and I do not think we will have measurable disease to assess response either serologically or radiographically.  I have communicated this as well to Dr. Ortiz.  Presuming we proceed with his track I will see her the month out from her final weekly Rituxan. Odds of high-grade lymphoma would seem unlikely with minimally elevated LDH and uric acid but I will ask my nurse  to give uric acid depleting allopurinol prescription.    - 1/25/2024 PillCam showed small nonbleeding angiodysplasias in the jejunum.  Ulcerated and edematous mucosa with luminal stenosis in the distal ileum.  No obvious mass or evidence of lymphoma.    -1/26/2024 chemotherapy preparation visit.    -1/29/2024 through 2/19/2024 Rituxan weekly x 4.  Normal weekly CBC through this.  Consultation with nutritionists    -2/19/2024 [facility] oncology follow-up: Today is her fourth of 4 weekly Rituxan treatments thus far with no complications.  CBC today is unremarkable.  Will get CMP but urinating well and no hint of fluid overload from renal dysfunction.  Capsule endoscopy only showed the lesion in the ileum which previously on biopsy showed the lymphoma.  No other disease on PET.  We will repeat CTs and endoscopy with Dr. Ortiz in 3 months.  Tolerated Rituxan without complication.  Still having diarrhea which predated the Rituxan.  She cannot work with this.    -5/20/2024 CT chest abdomen pelvis with contrast compared to January PET shows stable 10 mm left thyroid nodule.  No evidence of recurrent or residual lymphoma in the chest abdomen or pelvis.  CBC normal.    -5/20/2024 [facility] oncology hematology follow-up: No  evidence of radiographic recurrence but, again, this was not visible radiographically to begin with but seen on endoscopy.  She has endoscopy with Dr. Ortiz next week.  Presuming he finds nothing sinister I will repeat her CTs and labs again in 4 months.  Still has about 7 loose stools a day which is about her baseline.    -5/29/2024 ileocolonoscopy Dr. Ortiz showed no ulcerations or colitis and mild diverticulosis.    -9/20/2024 CT chest abdomen pelvis with contrast showed no evidence of recurrence chest abdomen or pelvis.    -9/20/2024 [facility] oncology hematology follow-up: Status post Rituxan first part of this year for management of small bowel lymphoma, has no evidence of lymphoma on ileocolonoscopy in May with Dr. Ortiz nor on CT today. CBC today entirely normal.  CMP , LDH, uric acid all pending today.  Will have her see my nurse practitioner back in 4 months with repeat CBC CMP uric acid and LDH prior to return and in the absence of new symptoms or palpable adenopathy I would do no further scans at this junction as her disease and the bowel has never been particularly visible with imaging and I would be unlikely to intervene with therapy in the absence of symptomatic progression.    -1/27/2025 CBC normal, CMP glucose 120, BUN 16, creatinine 0.92, sodium 146, ALT 31, AST stable at 36 with alkaline phosphatase 126, total bilirubin 0.9.  Uric acid 5.3,  (4 months ago 238).  -1/27/2025 [facility] hematology/oncology clinic follow-up: Cata is doing well.  Labs as shown above are stable.  She has no new symptoms that are worrisome.  Bowel habits seem to be the same with occasional loose stool.  She is eating well and her weight is stable.  She has some mild pruritus but this seems to coincide with the winter months and they do use wood-burning stove.  She is going to look into a humidifier.  If it does not improve or worsens she will let us know.  She has no rash.  In regards to her insomnia, I  have recommended she try something like melatonin or Tylenol PM to help her fall asleep.  She will discuss further with Dr. Collado her PCP.  We also discussed good sleep hygiene and I think her sleep schedule is a little bit off since she retired.  We will plan on seeing her back in 6 months for follow-up with repeat CBC, CMP and LDH on return and I have ordered those today.  She understands to notify us in the interim if she has any concerns such as changes in her GI system, weight loss abdominal pain etc.    - 7/21/2025 [facility] hematology oncology follow-up: That she still has 2-3 loose stools a day and occasional abdominal pains overall feeling fairly fit and does not want repeat endoscopy.  Blood counts today unremarkable CMP and LDH pending.  Will see us again in 6 months.  Scans if symptoms dictate but primarily repeat endoscopy if symptoms arise.     Grade I follicular lymphoma of extranodal site excluding spleen and other solid organs   1/4/2024 Initial Diagnosis    Grade I follicular lymphoma of extranodal site excluding spleen and other solid organs     1/4/2024 Cancer Staged    Staging form: Hodgkin And Non-Hodgkin Lymphoma, AJCC 8th Edition  - Clinical stage from 1/4/2024: Stage IV (Follicular lymphoma) - Signed by En Carballo MD on 1/4/2024 1/29/2024 -  Chemotherapy    OP LYMPHOMA (CLL) RiTUXimab (Weekly X 4)         HISTORY OF PRESENT ILLNESS:  The patient is a 74 y.o. female, here for follow up on management of low-grade follicular lymphoma of terminal ileum status post Rituxan in early 2024    History reviewed. No pertinent past medical history.  Past Surgical History:   Procedure Laterality Date   • OTHER SURGICAL HISTORY  01/25/2024    Pill Cam       No Known Allergies    Family History and Social History reviewed and changed as necessary    REVIEW OF SYSTEM:   No other somatic complaints or B symptoms    PHYSICAL EXAM:  No cervical axillary or inguinal adenopathy.  No palpable  "hepatosplenomegaly.  No rashes.  A few spider angiomas of the upper torso.    Vitals:    07/21/25 1402   BP: 152/73   Pulse: 79   Resp: 18   Temp: 98.3 °F (36.8 °C)   SpO2: 95%   Weight: 73.9 kg (163 lb)   Height: 157.5 cm (62\")     Vitals:    07/21/25 1402   PainSc: 0-No pain          ECOG score: 1           Vitals reviewed.    ECOG: (1) Restricted in Physically Strenuous Activity, Ambulatory & Able to Do Work of Light Nature    Lab Results   Component Value Date    HGB 15.2 07/21/2025    HCT 46.3 07/21/2025    MCV 98.3 (H) 07/21/2025     07/21/2025    WBC 5.18 07/21/2025    NEUTROABS 3.16 07/21/2025    LYMPHSABS 1.57 07/21/2025    MONOSABS 0.31 07/21/2025    EOSABS 0.09 07/21/2025    BASOSABS 0.04 07/21/2025       Lab Results   Component Value Date    GLUCOSE 120 (H) 01/27/2025    BUN 16 01/27/2025    CREATININE 0.92 01/27/2025     (H) 01/27/2025    K 4.0 01/27/2025     (H) 01/27/2025    CO2 27.0 01/27/2025    CALCIUM 9.6 01/27/2025    PROTEINTOT 6.5 01/27/2025    ALBUMIN 4.1 01/27/2025    BILITOT 0.9 01/27/2025    ALKPHOS 126 (H) 01/27/2025    AST 36 (H) 01/27/2025    ALT 31 01/27/2025             ASSESSMENT & PLAN:  1.  Follicular low-grade B-cell lymphoma terminal ileum with negative CT scan chest abdomen pelvis and PET.  No other obvious GI involvement on PillCam.  1/29/2024 through 2/19/2024 Rituxan weekly x 4.  In ED on CT chest abdomen pelvis April 2024  2.  Stage IIIa chronic kidney disease  3.  AVM without bleeding    Hematology oncology history timeline:  -9/23/2023 stool cultures negative for pathogens  -10/6/2023 EGD and colonoscopy Dr. Ortiz.  Antral biopsies showed minimal chronic gastritis H. pylori negative.  Transverse colon has a serrated sessile polyp negative for dysplasia.  Ileum biopsy showed atypical lymphoid infiltrate with some monotony.  Extra departmental review by [facility] [place] [facility] consultant agreed with atypical lymphoid infiltrate that would support a " low-grade follicular lymphoma but clinical correlation is essential per consultant.  Cells are positive for CD20 and less positive for CD3 and CD5.  Bcl-2 and CD10 strongly positive negative for cyclin D1.  -10/31/2023 CT chest abdomen pelvis with contrast with no worrisome findings for occult neoplasm.  Normal CBC and differential.  CMP unremarkable save for creatinine 1.17 GFR 49.7.  LDH mildly elevated 303 upper limit of normal 214.  -12/5/2023 colonoscopy 4 small ulcerationsin distal tumor ileum showed sigmoid polyp tubular adenoma. No colitis. Terminal ileum biopsy showed atypical lambda restricted clonal lymphoid proliferation consistent with low-grade lymphoproliferative disorder such as low-grade follicular lymphoma.  Addendum: Immunostain for H. pylori and Campylobacter is negative.    -1/4/2024 [facility] medical oncology hematology consult: Patient has felt vaguely poorly for the better part of the year.  Around September started having nausea and occasional vomiting and occasional diarrhea but mostly just progressive fatigue despite no anemia.  Endoscopy in October and again in December showed ulcerations in the distal ileum without colitis but with lambda restricted clonal B-cell low-grade follicular lymphoma.  This is unlikely to be the immunoproliferative alpha heavy chain related MALT lymphoma but to be more likely the nonimmunoproliferative low-grade follicular lymphoma that may be associated with inflammatory bowel though the ileal biopsies do not confirm colitis nor did the CAT scan show any colitis.  PET scans are not generally extremely helpful with low-grade lymphomas but given that we do not have any other easily measurable disease I think it is reasonable to check a PET for that as well as to be sure there is no unexpectedly hot areas that may have discordant histology that may need more aggressive therapy if higher grade.  If the pet does light up vaguely, that would not distinguish  inflammatory bowel from low-grade follicular B-cell lymphoma of the small bowel as they would both light up similarly.  I have communicated with Dr. Ortiz to get the ileum biopsy from December stain for H. pylori and Campylobacter jejuni and I will check her CBC, CMP, HIV, hep B, hep C, H. pylori breath test, and alpha heavy chains along with serum immunofixation electrophoresis.  At the end of the day, treatment will largely be guided by her symptoms which while nebulous are significant enough that I suspect we will end up giving her Rituxan in the next month or 2 pending results of these tests.  She understands the common dilemma with low-grade lymphomas of deciding when to treat as there is a very fine risk-benefit ratio balance.She has had frequent upper respiratory infections and I will check her quantitative immunoglobulins as well.    -1/4/2024 data:  CBC normal with white count 5580 hemoglobin 15.4 platelets 204,000 and normal differential.  Glucose 108 creatinine 1.02 GFR 58 chloride 110 alk phos 129 otherwise unremarkable CMP.  Negative H. pylori breath test.  Normal serum immunoglobulin G/A/M kappa and lambda light chains with normal heavy to light chain ratio.  Normal quantitative immunoglobulins with no serum M spike.  Hepatitis C and B antibodies negative.  HIV 1 and 2 negative.   upper limit of normal 214.  Uric acid 5.9 upper limit of normal 5.7.  Sedimentation rate 19 normal.    -1/11/24 PET showed no hypermetabolism with normal-appearing bowel.    -1/18/2024 [facility] oncology telemedicine follow-up: I reviewed the above data with the patient.  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  She does have a capsule endoscopy for Tuesday of next week  and I have left a message with Dr. 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  In order to know whether or not treatment is effective, she will need repeat endoscopy about 3 months out from treatment as the scans not surprisingly are entirely negative and I do not think we will have measurable disease to assess response either serologically or radiographically.  I have communicated this as well to Dr. Ortiz.  Presuming we proceed with his track I will see her the month out from her final weekly Rituxan. Odds of high-grade lymphoma would seem unlikely with minimally elevated LDH and uric acid but I will ask my nurse  to give uric acid depleting allopurinol prescription.    - 1/25/2024 PillCam showed small nonbleeding angiodysplasias in the jejunum.  Ulcerated and edematous mucosa with luminal stenosis in the distal ileum.  No obvious mass or evidence of lymphoma.    -1/26/2024 chemotherapy preparation visit.    -1/29/2024 through 2/19/2024 Rituxan weekly x 4.  Normal weekly CBC through this.  Consultation with nutritionists    -2/19/2024 [facility] medical oncology follow-up: Today is her fourth of 4 weekly Rituxan treatments thus far with no complications.  CBC today is unremarkable.  Will get CMP but urinating well and no hint of fluid overload from renal dysfunction.  Capsule endoscopy only showed the lesion in the ileum which previously on biopsy showed the lymphoma.  No other disease on PET.  We will repeat CTs and  endoscopy with Dr. Ortiz in 3 months.  Tolerated Rituxan without complication.  Still having diarrhea which predated the Rituxan.  She cannot work with this.    -5/20/2024 CT chest abdomen pelvis with contrast compared to January PET shows stable 10 mm left thyroid nodule.  No evidence of recurrent or residual lymphoma in the chest abdomen or pelvis.  CBC normal.    -5/20/2024 [facility] oncology hematology follow-up: No evidence of radiographic recurrence but, again, this was not visible radiographically to begin with but seen on endoscopy.  She has endoscopy with Dr. Ortiz next week.  Presuming he finds nothing sinister I will repeat her CTs and labs again in 4 months.  Still has about 7 loose stools a day which is about her baseline.    -5/29/2024 ileocolonoscopy Dr. Ortiz showed no ulcerations or colitis and mild diverticulosis.    -9/20/2024 CT chest abdomen pelvis with contrast showed no evidence of recurrence chest abdomen or pelvis.    -9/20/2024 [facility] oncology hematology follow-up: Status post Rituxan first part of this year for management of small bowel lymphoma, has no evidence of lymphoma on ileocolonoscopy in May with Dr. Ortiz nor on CT today. CBC today entirely normal.  CMP , LDH, uric acid all pending today.  Will have her see my nurse practitioner back in 4 months with repeat CBC CMP uric acid and LDH prior to return and in the absence of new symptoms or palpable adenopathy I would do no further scans at this junction as her disease and the bowel has never been particularly visible with imaging and I would be unlikely to intervene with therapy in the absence of symptomatic progression.    -1/27/2025 CBC normal, CMP glucose 120, BUN 16, creatinine 0.92, sodium 146, ALT 31, AST stable at 36 with alkaline phosphatase 126, total bilirubin 0.9.  Uric acid 5.3,  (4 months ago 238).  -1/27/2025 [facility] hematology/oncology clinic follow-up: Cata is doing well.  Labs as shown above  are stable.  She has no new symptoms that are worrisome.  Bowel habits seem to be the same with occasional loose stool.  She is eating well and her weight is stable.  She has some mild pruritus but this seems to coincide with the winter months and they do use wood-burning stove.  She is going to look into a humidifier.  If it does not improve or worsens she will let us know.  She has no rash.  In regards to her insomnia, I have recommended she try something like melatonin or Tylenol PM to help her fall asleep.  She will discuss further with Dr. Collado her PCP.  We also discussed good sleep hygiene and I think her sleep schedule is a little bit off since she retired.  We will plan on seeing her back in 6 months for follow-up with repeat CBC, CMP and LDH on return and I have ordered those today.  She understands to notify us in the interim if she has any concerns such as changes in her GI system, weight loss abdominal pain etc.    - 7/21/2025 [facility] hematology oncology follow-up: That she still has 2-3 loose stools a day and occasional abdominal pains overall feeling fairly fit and does not want repeat endoscopy.  Blood counts today unremarkable CMP and LDH pending.  Will see us again in 6 months.  Scans if symptoms dictate but primarily repeat endoscopy if symptoms arise.    Total time of care today inclusive of time spent today prior to her arrival reviewing interval data from labs today and during visit interviewing her as to signs or symptoms of her disease and management thereof and after visit instituting this plan took 35 minutes patient care time throughout the day today.  En Carballo MD    07/21/2025